# Patient Record
Sex: FEMALE | Race: AMERICAN INDIAN OR ALASKA NATIVE | NOT HISPANIC OR LATINO | URBAN - METROPOLITAN AREA
[De-identification: names, ages, dates, MRNs, and addresses within clinical notes are randomized per-mention and may not be internally consistent; named-entity substitution may affect disease eponyms.]

---

## 2017-06-22 ENCOUNTER — EMERGENCY (EMERGENCY)
Facility: HOSPITAL | Age: 62
LOS: 1 days | Discharge: HOME | End: 2017-06-22
Attending: EMERGENCY MEDICINE | Admitting: EMERGENCY MEDICINE
Payer: MEDICAID

## 2017-06-22 VITALS
SYSTOLIC BLOOD PRESSURE: 125 MMHG | OXYGEN SATURATION: 99 % | HEART RATE: 64 BPM | DIASTOLIC BLOOD PRESSURE: 80 MMHG | RESPIRATION RATE: 16 BRPM

## 2017-06-22 VITALS
OXYGEN SATURATION: 100 % | TEMPERATURE: 98 F | RESPIRATION RATE: 16 BRPM | HEART RATE: 65 BPM | DIASTOLIC BLOOD PRESSURE: 77 MMHG | SYSTOLIC BLOOD PRESSURE: 152 MMHG

## 2017-06-22 LAB
ALBUMIN SERPL ELPH-MCNC: 4.1 G/DL — SIGNIFICANT CHANGE UP (ref 3.3–5)
ALP SERPL-CCNC: 66 U/L — SIGNIFICANT CHANGE UP (ref 40–120)
ALT FLD-CCNC: 22 U/L — SIGNIFICANT CHANGE UP (ref 4–33)
AST SERPL-CCNC: 26 U/L — SIGNIFICANT CHANGE UP (ref 4–32)
BASE EXCESS BLDV CALC-SCNC: 1.6 MMOL/L — SIGNIFICANT CHANGE UP
BASOPHILS # BLD AUTO: 0.02 K/UL — SIGNIFICANT CHANGE UP (ref 0–0.2)
BASOPHILS NFR BLD AUTO: 0.4 % — SIGNIFICANT CHANGE UP (ref 0–2)
BILIRUB SERPL-MCNC: 0.4 MG/DL — SIGNIFICANT CHANGE UP (ref 0.2–1.2)
BLOOD GAS VENOUS - CREATININE: 0.47 MG/DL — LOW (ref 0.5–1.3)
BUN SERPL-MCNC: 10 MG/DL — SIGNIFICANT CHANGE UP (ref 7–23)
CALCIUM SERPL-MCNC: 9.5 MG/DL — SIGNIFICANT CHANGE UP (ref 8.4–10.5)
CHLORIDE BLDV-SCNC: 109 MMOL/L — HIGH (ref 96–108)
CHLORIDE SERPL-SCNC: 100 MMOL/L — SIGNIFICANT CHANGE UP (ref 98–107)
CK MB BLD-MCNC: 1.27 NG/ML — SIGNIFICANT CHANGE UP (ref 1–4.7)
CK MB BLD-MCNC: 1.78 NG/ML — SIGNIFICANT CHANGE UP (ref 1–4.7)
CK MB BLD-MCNC: SIGNIFICANT CHANGE UP (ref 0–2.5)
CK SERPL-CCNC: 100 U/L — SIGNIFICANT CHANGE UP (ref 25–170)
CK SERPL-CCNC: 91 U/L — SIGNIFICANT CHANGE UP (ref 25–170)
CO2 SERPL-SCNC: 23 MMOL/L — SIGNIFICANT CHANGE UP (ref 22–31)
CREAT SERPL-MCNC: 0.57 MG/DL — SIGNIFICANT CHANGE UP (ref 0.5–1.3)
EOSINOPHIL # BLD AUTO: 0.03 K/UL — SIGNIFICANT CHANGE UP (ref 0–0.5)
EOSINOPHIL NFR BLD AUTO: 0.5 % — SIGNIFICANT CHANGE UP (ref 0–6)
GAS PNL BLDV: 135 MMOL/L — LOW (ref 136–146)
GLUCOSE BLDV-MCNC: 113 — HIGH (ref 70–99)
GLUCOSE SERPL-MCNC: 108 MG/DL — HIGH (ref 70–99)
HCO3 BLDV-SCNC: 25 MMOL/L — SIGNIFICANT CHANGE UP (ref 20–27)
HCT VFR BLD CALC: 37 % — SIGNIFICANT CHANGE UP (ref 34.5–45)
HCT VFR BLDV CALC: 38.3 % — SIGNIFICANT CHANGE UP (ref 34.5–45)
HGB BLD-MCNC: 12.2 G/DL — SIGNIFICANT CHANGE UP (ref 11.5–15.5)
HGB BLDV-MCNC: 12.5 G/DL — SIGNIFICANT CHANGE UP (ref 11.5–15.5)
IMM GRANULOCYTES NFR BLD AUTO: 0.2 % — SIGNIFICANT CHANGE UP (ref 0–1.5)
LACTATE BLDV-MCNC: 1.5 MMOL/L — SIGNIFICANT CHANGE UP (ref 0.5–2)
LYMPHOCYTES # BLD AUTO: 2.1 K/UL — SIGNIFICANT CHANGE UP (ref 1–3.3)
LYMPHOCYTES # BLD AUTO: 38.2 % — SIGNIFICANT CHANGE UP (ref 13–44)
MCHC RBC-ENTMCNC: 28.9 PG — SIGNIFICANT CHANGE UP (ref 27–34)
MCHC RBC-ENTMCNC: 33 % — SIGNIFICANT CHANGE UP (ref 32–36)
MCV RBC AUTO: 87.7 FL — SIGNIFICANT CHANGE UP (ref 80–100)
MONOCYTES # BLD AUTO: 0.43 K/UL — SIGNIFICANT CHANGE UP (ref 0–0.9)
MONOCYTES NFR BLD AUTO: 7.8 % — SIGNIFICANT CHANGE UP (ref 2–14)
NEUTROPHILS # BLD AUTO: 2.91 K/UL — SIGNIFICANT CHANGE UP (ref 1.8–7.4)
NEUTROPHILS NFR BLD AUTO: 52.9 % — SIGNIFICANT CHANGE UP (ref 43–77)
PCO2 BLDV: 48 MMHG — SIGNIFICANT CHANGE UP (ref 41–51)
PH BLDV: 7.36 PH — SIGNIFICANT CHANGE UP (ref 7.32–7.43)
PLATELET # BLD AUTO: 341 K/UL — SIGNIFICANT CHANGE UP (ref 150–400)
PMV BLD: 10.2 FL — SIGNIFICANT CHANGE UP (ref 7–13)
PO2 BLDV: 45 MMHG — HIGH (ref 35–40)
POTASSIUM BLDV-SCNC: 3.7 MMOL/L — SIGNIFICANT CHANGE UP (ref 3.4–4.5)
POTASSIUM SERPL-MCNC: 4.2 MMOL/L — SIGNIFICANT CHANGE UP (ref 3.5–5.3)
POTASSIUM SERPL-SCNC: 4.2 MMOL/L — SIGNIFICANT CHANGE UP (ref 3.5–5.3)
PROT SERPL-MCNC: 8.1 G/DL — SIGNIFICANT CHANGE UP (ref 6–8.3)
RBC # BLD: 4.22 M/UL — SIGNIFICANT CHANGE UP (ref 3.8–5.2)
RBC # FLD: 12.9 % — SIGNIFICANT CHANGE UP (ref 10.3–14.5)
SAO2 % BLDV: 77.8 % — SIGNIFICANT CHANGE UP (ref 60–85)
SODIUM SERPL-SCNC: 138 MMOL/L — SIGNIFICANT CHANGE UP (ref 135–145)
TROPONIN T SERPL-MCNC: < 0.06 NG/ML — SIGNIFICANT CHANGE UP (ref 0–0.06)
TROPONIN T SERPL-MCNC: < 0.06 NG/ML — SIGNIFICANT CHANGE UP (ref 0–0.06)
TSH SERPL-MCNC: 0.18 UIU/ML — LOW (ref 0.27–4.2)
WBC # BLD: 5.5 K/UL — SIGNIFICANT CHANGE UP (ref 3.8–10.5)
WBC # FLD AUTO: 5.5 K/UL — SIGNIFICANT CHANGE UP (ref 3.8–10.5)

## 2017-06-22 PROCEDURE — 99284 EMERGENCY DEPT VISIT MOD MDM: CPT | Mod: 25

## 2017-06-22 PROCEDURE — 93010 ELECTROCARDIOGRAM REPORT: CPT | Mod: 59

## 2017-06-22 RX ORDER — MECLIZINE HCL 12.5 MG
1 TABLET ORAL
Qty: 15 | Refills: 0
Start: 2017-06-22 | End: 2017-06-27

## 2017-06-22 RX ORDER — MECLIZINE HCL 12.5 MG
50 TABLET ORAL ONCE
Qty: 0 | Refills: 0 | Status: COMPLETED | OUTPATIENT
Start: 2017-06-22 | End: 2017-06-22

## 2017-06-22 RX ADMIN — Medication 50 MILLIGRAM(S): at 10:20

## 2017-06-22 NOTE — ED PROVIDER NOTE - PROGRESS NOTE DETAILS
ED Attending Dr. Love: no longer with dizziness s/p ED treatment ED Attending Dr. Love: pt ambulating well in ED without difficulty, no ataxia ED Attending Dr. Love: pt stable, notes feeling well s/p treatment, will discharge with instructions to follow up with PMD and Rx meclizine

## 2017-06-22 NOTE — ED PROVIDER NOTE - OBJECTIVE STATEMENT
61 yo female with ?DM (diet-controlled,  in ED), in ED with few hours of "body spinning" sensation that made taking a walk this morning difficult.  Associated with 2 weeks of arm tingling bilaterally.  No CP/SOB, N/V/D or abdominal pain.

## 2017-06-22 NOTE — ED PROVIDER NOTE - CARE PLAN
Principal Discharge DX:	Benign paroxysmal positional vertigo, unspecified laterality  Instructions for follow-up, activity and diet:	1. TAKE ALL MEDICATIONS AS DIRECTED.    2. FOR PAIN OR FEVER YOU CAN TAKE IBUPROFEN (MOTRIN, ADVIL) OR ACETAMINOPHEN (TYLENOL) AS NEEDED, AS DIRECTED ON PACKAGING.  3. FOLLOW UP WITH YOUR PRIMARY DOCTOR WITHIN 5 DAYS AS DIRECTED.  4. IF YOU HAD LABS OR IMAGING DONE, YOU WERE GIVEN COPIES OF ALL LABS AND/OR IMAGING RESULTS FROM YOUR ER VISIT--PLEASE TAKE THEM WITH YOU TO YOUR FOLLOW UP APPOINTMENTS.  5. IF NEEDED, CALL PATIENT ACCESS SERVICES AT [phone] TO FIND A PRIMARY CARE PHYSICIAN.  OR CALL [phone] TO MAKE AN APPOINTMENT WITH THE CLINIC.  6. RETURN TO THE ER FOR ANY WORSENING SYMPTOMS OR CONCERNS.

## 2017-06-22 NOTE — ED ADULT TRIAGE NOTE - CHIEF COMPLAINT QUOTE
Patient brought in by EMS from home c/o dizziness and b/l arm "cramping." Symptoms resolved at present. Admits to decreased fluid intake. PMH- hypothyroid

## 2017-06-22 NOTE — ED SUB INTERN NOTE - MEDICAL DECISION MAKING DETAILS
63 y/o F w/ history of hypothyroidism and borderline DM presents with vertiginous dizziness since this morning. Clinical presentation and lack of focal findings on neuro exam make BPPV most likely, given history of DM and description of recent left arm paresthesias will obtain 2 sets of cardiac enzymes to r/o ischemia. Will start meclizine for dizziness symptoms and reassess.

## 2017-06-22 NOTE — ED PROVIDER NOTE - PLAN OF CARE
1. TAKE ALL MEDICATIONS AS DIRECTED.    2. FOR PAIN OR FEVER YOU CAN TAKE IBUPROFEN (MOTRIN, ADVIL) OR ACETAMINOPHEN (TYLENOL) AS NEEDED, AS DIRECTED ON PACKAGING.  3. FOLLOW UP WITH YOUR PRIMARY DOCTOR WITHIN 5 DAYS AS DIRECTED.  4. IF YOU HAD LABS OR IMAGING DONE, YOU WERE GIVEN COPIES OF ALL LABS AND/OR IMAGING RESULTS FROM YOUR ER VISIT--PLEASE TAKE THEM WITH YOU TO YOUR FOLLOW UP APPOINTMENTS.  5. IF NEEDED, CALL PATIENT ACCESS SERVICES AT [phone] TO FIND A PRIMARY CARE PHYSICIAN.  OR CALL [phone] TO MAKE AN APPOINTMENT WITH THE CLINIC.  6. RETURN TO THE ER FOR ANY WORSENING SYMPTOMS OR CONCERNS.

## 2017-06-22 NOTE — ED PROVIDER NOTE - MEDICAL DECISION MAKING DETAILS
body spinning sensation consistent with likely BPPV, low suspicion for central cause of symptoms; PLAN--meclizine, CE x 2 (low HEART score, low suspicion for ACS), re-eval

## 2017-06-22 NOTE — ED ADULT NURSE NOTE - OBJECTIVE STATEMENT
Pt received to Rm 1 AxOx4 with c/o dizziness w/o fainting and SOB started since this morning. Pt also stated that she has bilateral cramping in the arms with more tingling feeling to the left arm and left shoulder x 15 days. Pt denies of pain/chest pain/N/V/D. Pt was seen and evaluated by EDMD. Lab are drawn and sent. VS are as noted. FS was performed (117). Pt has 20G IV PIV placed in the right hand. Pt is able to ambulate with assistance. Pt's sister in-law is at bed side. Will continue to monitor Pt received to Rm 1 AxOx4 with c/o dizziness, SOB w/o LOC started since this morning. Pt also stated that she has bilateral cramping in the arms with more tingling feeling to the left arm and left shoulder x 15 days. Pt denies of pain/chest pain/N/V/D. Pt was seen and evaluated by EDMD. Lab are drawn and sent. VS are as noted. FS was performed (117). Pt has 20G IV PIV placed in the right hand. Pt is able to ambulate with assistance. Pt's sister in-law is at bed side. Will continue to monitor

## 2017-06-22 NOTE — ED SUB INTERN NOTE - OBJECTIVE STATEMENT FT
61 y/o [place] speaking female with history of hypothyroidism and borderline DM (diet controlled) presents with 3 hours of vertiginous dizziness. Pt states dizziness came on gradually this morning, described as room spinning and worse with movement. A/W generalized weakness and head "heaviness" but denies any headache. She also reports 2 weeks of intermittent bilateral arm cramping and left arm "tingling." She denies any CP/SOB/palpitations, syncope, fever/chills, melena or hematochezia, abd pain, n/v/d, hearing or vision changes, focal numbness or weakness, or recent URI. 63 y/o [place] speaking female (preferring family member to translate instead of  phone) with history of hypothyroidism and borderline DM (diet controlled) presents with 3 hours of vertiginous dizziness. Pt states dizziness came on gradually this morning, described as room spinning and worse with movement. A/W generalized weakness and head "heaviness" but denies any headache. She also reports 2 weeks of intermittent bilateral arm cramping and left arm "tingling." She denies any CP/SOB/palpitations, syncope, fever/chills, melena or hematochezia, abd pain, n/v/d, hearing or vision changes, focal numbness or weakness, or recent URI.

## 2020-02-07 ENCOUNTER — INPATIENT (INPATIENT)
Facility: HOSPITAL | Age: 65
LOS: 2 days | Discharge: HOME | End: 2020-02-10
Attending: INTERNAL MEDICINE | Admitting: INTERNAL MEDICINE
Payer: MEDICARE

## 2020-02-07 VITALS
TEMPERATURE: 98 F | OXYGEN SATURATION: 100 % | HEART RATE: 72 BPM | DIASTOLIC BLOOD PRESSURE: 94 MMHG | SYSTOLIC BLOOD PRESSURE: 163 MMHG | RESPIRATION RATE: 16 BRPM

## 2020-02-07 DIAGNOSIS — R07.9 CHEST PAIN, UNSPECIFIED: ICD-10-CM

## 2020-02-07 LAB
ALBUMIN SERPL ELPH-MCNC: 4.2 G/DL — SIGNIFICANT CHANGE UP (ref 3.3–5)
ALP SERPL-CCNC: 78 U/L — SIGNIFICANT CHANGE UP (ref 40–120)
ALT FLD-CCNC: 51 U/L — HIGH (ref 4–33)
ANION GAP SERPL CALC-SCNC: 11 MMO/L — SIGNIFICANT CHANGE UP (ref 7–14)
AST SERPL-CCNC: 45 U/L — HIGH (ref 4–32)
BILIRUB SERPL-MCNC: 0.4 MG/DL — SIGNIFICANT CHANGE UP (ref 0.2–1.2)
BUN SERPL-MCNC: 12 MG/DL — SIGNIFICANT CHANGE UP (ref 7–23)
CALCIUM SERPL-MCNC: 9.3 MG/DL — SIGNIFICANT CHANGE UP (ref 8.4–10.5)
CHLORIDE SERPL-SCNC: 102 MMOL/L — SIGNIFICANT CHANGE UP (ref 98–107)
CO2 SERPL-SCNC: 23 MMOL/L — SIGNIFICANT CHANGE UP (ref 22–31)
CREAT SERPL-MCNC: 0.6 MG/DL — SIGNIFICANT CHANGE UP (ref 0.5–1.3)
GLUCOSE SERPL-MCNC: 107 MG/DL — HIGH (ref 70–99)
HCT VFR BLD CALC: 41.2 % — SIGNIFICANT CHANGE UP (ref 34.5–45)
HGB BLD-MCNC: 13.3 G/DL — SIGNIFICANT CHANGE UP (ref 11.5–15.5)
MCHC RBC-ENTMCNC: 28.8 PG — SIGNIFICANT CHANGE UP (ref 27–34)
MCHC RBC-ENTMCNC: 32.3 % — SIGNIFICANT CHANGE UP (ref 32–36)
MCV RBC AUTO: 89.2 FL — SIGNIFICANT CHANGE UP (ref 80–100)
NRBC # FLD: 0 K/UL — SIGNIFICANT CHANGE UP (ref 0–0)
PLATELET # BLD AUTO: 355 K/UL — SIGNIFICANT CHANGE UP (ref 150–400)
PMV BLD: 9.9 FL — SIGNIFICANT CHANGE UP (ref 7–13)
POTASSIUM SERPL-MCNC: 3.9 MMOL/L — SIGNIFICANT CHANGE UP (ref 3.5–5.3)
POTASSIUM SERPL-SCNC: 3.9 MMOL/L — SIGNIFICANT CHANGE UP (ref 3.5–5.3)
PROT SERPL-MCNC: 8.8 G/DL — HIGH (ref 6–8.3)
RBC # BLD: 4.62 M/UL — SIGNIFICANT CHANGE UP (ref 3.8–5.2)
RBC # FLD: 13 % — SIGNIFICANT CHANGE UP (ref 10.3–14.5)
SODIUM SERPL-SCNC: 136 MMOL/L — SIGNIFICANT CHANGE UP (ref 135–145)
TROPONIN T, HIGH SENSITIVITY: 7 NG/L — SIGNIFICANT CHANGE UP (ref ?–14)
TROPONIN T, HIGH SENSITIVITY: 8 NG/L — SIGNIFICANT CHANGE UP (ref ?–14)
WBC # BLD: 6.7 K/UL — SIGNIFICANT CHANGE UP (ref 3.8–10.5)
WBC # FLD AUTO: 6.7 K/UL — SIGNIFICANT CHANGE UP (ref 3.8–10.5)

## 2020-02-07 PROCEDURE — 71046 X-RAY EXAM CHEST 2 VIEWS: CPT | Mod: 26

## 2020-02-07 PROCEDURE — 99223 1ST HOSP IP/OBS HIGH 75: CPT

## 2020-02-07 RX ORDER — PANTOPRAZOLE SODIUM 20 MG/1
40 TABLET, DELAYED RELEASE ORAL
Refills: 0 | Status: DISCONTINUED | OUTPATIENT
Start: 2020-02-08 | End: 2020-02-10

## 2020-02-07 RX ORDER — ASPIRIN/CALCIUM CARB/MAGNESIUM 324 MG
162 TABLET ORAL ONCE
Refills: 0 | Status: COMPLETED | OUTPATIENT
Start: 2020-02-07 | End: 2020-02-07

## 2020-02-07 RX ORDER — PANTOPRAZOLE SODIUM 20 MG/1
40 TABLET, DELAYED RELEASE ORAL
Refills: 0 | Status: COMPLETED | OUTPATIENT
Start: 2020-02-07 | End: 2020-02-07

## 2020-02-07 RX ADMIN — PANTOPRAZOLE SODIUM 40 MILLIGRAM(S): 20 TABLET, DELAYED RELEASE ORAL at 23:41

## 2020-02-07 RX ADMIN — Medication 162 MILLIGRAM(S): at 19:05

## 2020-02-07 NOTE — H&P ADULT - NSHPSOCIALHISTORY_GEN_ALL_CORE
SOCIAL HISTORY:    Marital Status:  (  )    (  ) Single        (  )         (  )   Occupation:   Lives with:         (  ) alone        (  ) children    (  ) spouse           (  ) parents            (  ) other    No history of smoking  No history of alcohol abuse  No history of illegal drug use SOCIAL HISTORY:    Marital Status:  ( x )    (  ) Single        (  )         (  )   Occupation:   Lives with:         (  ) alone        (  ) children    ( x ) spouse           (  ) parents            (  ) other    No history of smoking  No history of alcohol abuse  No history of illegal drug use    Independently ambulatory at baseline

## 2020-02-07 NOTE — H&P ADULT - NSICDXFAMILYHX_GEN_ALL_CORE_FT
FAMILY HISTORY:  No pertinent family history in first degree relatives FAMILY HISTORY:  Family history of coronary artery disease, ~ brother (?s/p stent)  Family history of hypertension, ~ father

## 2020-02-07 NOTE — ED PROVIDER NOTE - OBJECTIVE STATEMENT
Vitor FALL MD PGY2: 64 F PMH hypothyroid here for intermittent L sided CP since this am. Patient was at home and felt acute L sided chest pain and said " I'm going! Goodbye!" and proceeded to lean against a wall and family called ambulance. Assoc sxs include nausea and diaphoresis. No syncope. Here currently just feels a funny feeling in her chest. No prior MI. No prior PE. No fever, chills, cough.

## 2020-02-07 NOTE — H&P ADULT - NSHPLABSRESULTS_GEN_ALL_CORE
13.3   6.70  )-----------( 355      ( 07 Feb 2020 19:20 )             41.2     07 Feb 2020 19:20    136    |  102    |  12     ----------------------------<  107    3.9     |  23     |  0.60     Ca    9.3        07 Feb 2020 19:20    TPro  8.8    /  Alb  4.2    /  TBili  0.4    /  DBili  x      /  AST  45     /  ALT  51     /  AlkPhos  78     07 Feb 2020 19:20    LIVER FUNCTIONS - ( 07 Feb 2020 19:20 )  Alb: 4.2 g/dL / Pro: 8.8 g/dL / ALK PHOS: 78 u/L / ALT: 51 u/L / AST: 45 u/L / GGT: x             CAPILLARY BLOOD GLUCOSE    =========================================================================      =========================================================================    Vital Signs Last 24 Hrs  T(C): 36.8 (07 Feb 2020 17:03), Max: 36.8 (07 Feb 2020 17:03)  T(F): 98.2 (07 Feb 2020 17:03), Max: 98.2 (07 Feb 2020 17:03)  HR: 72 (07 Feb 2020 17:03) (72 - 72)  BP: 163/94 (07 Feb 2020 17:03) (163/94 - 163/94)  BP(mean): --  RR: 16 (07 Feb 2020 17:03) (16 - 16)  SpO2: 100% (07 Feb 2020 17:03) (100% - 100%) 13.3   6.70  )-----------( 355      ( 07 Feb 2020 19:20 )             41.2     07 Feb 2020 19:20    136    |  102    |  12     ----------------------------<  107    3.9     |  23     |  0.60     Ca    9.3        07 Feb 2020 19:20    TPro  8.8    /  Alb  4.2    /  TBili  0.4    /  DBili  x      /  AST  45     /  ALT  51     /  AlkPhos  78     07 Feb 2020 19:20    LIVER FUNCTIONS - ( 07 Feb 2020 19:20 )  Alb: 4.2 g/dL / Pro: 8.8 g/dL / ALK PHOS: 78 u/L / ALT: 51 u/L / AST: 45 u/L / GGT: x             CAPILLARY BLOOD GLUCOSE    =========================================================================  NSR at 65 bpm, QTc = 430    =========================================================================    Vital Signs Last 24 Hrs  T(C): 36.8 (07 Feb 2020 17:03), Max: 36.8 (07 Feb 2020 17:03)  T(F): 98.2 (07 Feb 2020 17:03), Max: 98.2 (07 Feb 2020 17:03)  HR: 72 (07 Feb 2020 17:03) (72 - 72)  BP: 163/94 (07 Feb 2020 17:03) (163/94 - 163/94)  BP(mean): --  RR: 16 (07 Feb 2020 17:03) (16 - 16)  SpO2: 100% (07 Feb 2020 17:03) (100% - 100%)

## 2020-02-07 NOTE — ED ADULT NURSE NOTE - NSIMPLEMENTINTERV_GEN_ALL_ED
Implemented All Universal Safety Interventions:  [place]inger to call system. Call bell, personal items and telephone within reach. Instruct patient to call for assistance. Room bathroom lighting operational. Non-slip footwear when patient is off stretcher. Physically safe environment: no spills, clutter or unnecessary equipment. Stretcher in lowest position, wheels locked, appropriate side rails in place.

## 2020-02-07 NOTE — ED PROVIDER NOTE - ATTENDING CONTRIBUTION TO CARE
Tawny Rivera M.D: 64F hx hypothyroid p/w intermittent substernal chest pressure radiating to neck/back with intermittent diaphoresis and dizzines. never felt like this before, thoguht she was going to die. no sob no n/v. nonsmoker, no fam hx heart disease. well appearing on exam with clear lungs and rrr heart. no le edema. rest of exam per resident. primary concern is acs given concerning associated symptoms. plan for labs including troponin, cxr, ekg already performed no ischemic changes , and admission for further risk stratification.

## 2020-02-07 NOTE — ED ADULT NURSE NOTE - OBJECTIVE STATEMENT
Receive pt. in ER room 28 alert and oriented x 4, presenting to the ER with complaints of chest pain. Pt. have a history of hypothyroidism and speaks [place]. Patient's  at bedside stated " we went shopping and my wife said she have chest pain in the middle of her chest". Placed on cardiac monitor labs sent, medicated as ordered. Will continue to monitor.

## 2020-02-07 NOTE — H&P ADULT - PROBLEM SELECTOR PLAN 1
- epigastric area, burning in nature; asymptomatic at the time of being seen  - troponin = 7  - ECG = NSR at 65 bpm, QTc = 430  - no history of HTN, cardiac disease, but FH w/ father w/ HTN and brother w/ CAD  - f/u lab-work, TTE  - trial of PPI for possible GERD  - continues on Telemetry monitoring

## 2020-02-07 NOTE — H&P ADULT - NSHPREVIEWOFSYSTEMS_GEN_ALL_CORE
REVIEW OF SYSTEMS:    CONSTITUTIONAL: No weakness, fevers or chills  EYES/ENT: No visual changes;  No dysphagia  NECK: No pain or stiffness,  RESPIRATORY: No cough, hemoptysis; No shortness of breath  CARDIOVASCULAR: Epigastric area burning pain.  Some dizziness and generalized weakness associated with the pain.  No shortness of breath or palpitations; No lower extremity edema  GASTROINTESTINAL: Epigastric pain - burning in nature. No nausea, vomiting, or hematemesis; No diarrhea or constipation. No melena or hematochezia.  GENITOURINARY: No dysuria, frequency or hematuria  MUSCULOSKELETAL: No joint pain, swelling, decreased ROM, erythema, warmth  NEUROLOGICAL: No numbness or weakness  PSYCHIATRY: No anxiety, or depression.  SKIN: No itching, burning, rashes, or lesions   All other review of systems is negative unless indicated above.

## 2020-02-07 NOTE — H&P ADULT - HISTORY OF PRESENT ILLNESS
64 year old female, with past history significant for Hypothyroidism, presented to the ED secondary to intermittent L-sided chest pain.  Seen and evaluated at bedside;    Vital signs upon ED presentation as follows: BP = 163/94, HR = 72, RR = 16, T = 36.8 C (98.2 F), O2 Sat = 100% on RA.  Diagnosed with Chest Pain.  Prescribed Aspirin 162 mg in the ED. 64 year old female, with past history significant for Hypothyroidism, presented to the ED secondary to intermittent L-sided chest pain.  Seen and evaluated at bedside with  present; NAD.  Patient indicates  to provide history.  Per reports, ini the AM, patient reported pain of     Vital signs upon ED presentation as follows: BP = 163/94, HR = 72, RR = 16, T = 36.8 C (98.2 F), O2 Sat = 100% on RA.  Diagnosed with Chest Pain.  Prescribed Aspirin 162 mg in the ED. 64 year old female, with past history significant for Hypothyroidism, presented to the ED secondary to intermittent L-sided chest pain.  Seen and evaluated at bedside with  present; NAD.  Patient indicates  to provide history.  Per reports, ini the AM, patient reported burning pain in the epigastric area, which subsided after some time.  Later in the day, patient went shopping and again complained of pain in the epigastrium, but also had dizziness and tongue dryness.  Patient visited her PMD who advised coming to the ED for evaluation.  No reports of shortness of breath, palpitations, nausea, vomiting.  No prior similar episodes.  Patient notedly, is able to walk long distances - up to 6 miles, per .    Patient reports burning epigastric pain after eating pizza.  Tends to recline in bed very soon after eating dinner.    Vital signs upon ED presentation as follows: BP = 163/94, HR = 72, RR = 16, T = 36.8 C (98.2 F), O2 Sat = 100% on RA.  Diagnosed with Chest Pain.  Prescribed Aspirin 162 mg in the ED.

## 2020-02-07 NOTE — H&P ADULT - PROBLEM SELECTOR PLAN 2
- present for the past ~ 2 days  - possibly due to lifting heavy items as patient in the process of packing for traveling, but concerning also since patient also with chest area discomfort  - Tylenol PRN  - eval for improvement - BP = 163/94 on admission; SBP max = 169, DBP max = 94  - asymptomatic  - no history of same, but father w/ HTN and brother w/ CAD  - may have undiagnosed dz  - follow BP and initiate therapy if needed  - f/u TTE

## 2020-02-07 NOTE — H&P ADULT - ASSESSMENT
64 year old female, with past history significant for Hypothyroidism, presented to the ED secondary to intermittent L-sided chest pain.  Vital signs upon ED presentation as follows: BP = 163/94, HR = 72, RR = 16, T = 36.8 C (98.2 F), O2 Sat = 100% on RA.  Diagnosed with Chest Pain.  Admitted for further management of:

## 2020-02-07 NOTE — H&P ADULT - PROBLEM SELECTOR PLAN 5
1.  PCP Name                           =   2.  PCP Contacted at Admission =  [  ] Y       [  ] N          [  ] N/A  3.  PCP Contacted at Discharge  =  [  ] Y       [  ] N          [  ] N/A  4.  Post-Discharge Appointment Date and Location =   5.  Summary of Handoff Given to PCP                    = - on synthroid 50 mcg PTA; continuing

## 2020-02-07 NOTE — ED ADULT TRIAGE NOTE - CHIEF COMPLAINT QUOTE
Pt brought in form PCP by EMS initially for c/o of chest pain. As per EMS when pt was transferred into ambulance at approximately 1620 pt became aphasiac. Pt able to speak in triage. MD Sutton called for stroke eval. NO code stroke called. Pt c/o midsternal chest pain.

## 2020-02-07 NOTE — H&P ADULT - PROBLEM SELECTOR PLAN 3
- protein = 8.8; albumin = 4.2  - also has mildly elevated transaminases  - patient is vegetarian  - unclear etiology at present  - f/u AM lab-work  - further w/u as outpatient - present for the past ~ 2 days  - possibly due to lifting heavy items as patient in the process of packing for traveling, but concerning also since patient also with chest area discomfort  - Tylenol PRN  - eval for improvement

## 2020-02-07 NOTE — ED PROVIDER NOTE - PHYSICAL EXAMINATION
Vitor FALL MD PGY2:   PHYSICAL EXAM:    GENERAL: NAD, well-developed  HEENT:  Atraumatic, Normocephalic  CHEST/LUNG: Chest rise equal bilaterally  HEART: Regular rate and rhythm  ABDOMEN: Soft, Nontender, Nondistended  EXTREMITIES:  2+ Peripheral Pulses.  PSYCH: A&Ox3  SKIN: No obvious rashes or lesions

## 2020-02-07 NOTE — H&P ADULT - PROBLEM SELECTOR PLAN 4
- on synthroid 50 mcg PTA; continuing - protein = 8.8; albumin = 4.2  - also has mildly elevated transaminases  - patient is vegetarian  - unclear etiology at present  - f/u AM lab-work  - further w/u as outpatient

## 2020-02-07 NOTE — ED PROVIDER NOTE - CLINICAL SUMMARY MEDICAL DECISION MAKING FREE TEXT BOX
Vitor FALL MD PGY2: Patient here of  descent 65 y/o with severe CP at onset. EKG nl. WIll obtain trop and admit for further cardiac workup.

## 2020-02-07 NOTE — H&P ADULT - NSHPPHYSICALEXAM_GEN_ALL_CORE
PHYSICAL EXAMINATION:    APPEARANCE: Well appearing female, lying in stretcher in NAD. 	  HEENT: Mildly dry oral mucosa.  PERRL, EOMI	  LYMPHATIC: No lymphadenopathy  CARDIOVASCULAR: No reproducible chest tenderness.  (+) S1 S2, No JVD, No murmurs, No edema  RESPIRATORY: Lungs clear to auscultation	  PSYCHIATRIC: A&O x 3, Mood & affect appropriate to situation  GASTROINTESTINAL:  No reproducible epigastric area tenderness.  Soft, Non-tender, + BS	  SKIN: No rashes, No ecchymoses, No cyanosis	  NEUROLOGICAL: Non-focal, A&Ox3, nonfocal, NOBLES x 4  EXTREMITIES: Some tenderness to moderate palpation over B/L posterior legs.  Normal range of motion, No clubbing, cyanosis or edema  VASCULAR: Peripheral pulses palpable 2+ bilaterally

## 2020-02-08 DIAGNOSIS — N39.0 URINARY TRACT INFECTION, SITE NOT SPECIFIED: ICD-10-CM

## 2020-02-08 DIAGNOSIS — R07.9 CHEST PAIN, UNSPECIFIED: ICD-10-CM

## 2020-02-08 DIAGNOSIS — E88.09 OTHER DISORDERS OF PLASMA-PROTEIN METABOLISM, NOT ELSEWHERE CLASSIFIED: ICD-10-CM

## 2020-02-08 DIAGNOSIS — R03.0 ELEVATED BLOOD-PRESSURE READING, WITHOUT DIAGNOSIS OF HYPERTENSION: ICD-10-CM

## 2020-02-08 DIAGNOSIS — Z02.9 ENCOUNTER FOR ADMINISTRATIVE EXAMINATIONS, UNSPECIFIED: ICD-10-CM

## 2020-02-08 DIAGNOSIS — M25.511 PAIN IN RIGHT SHOULDER: ICD-10-CM

## 2020-02-08 DIAGNOSIS — E03.9 HYPOTHYROIDISM, UNSPECIFIED: ICD-10-CM

## 2020-02-08 LAB
24R-OH-CALCIDIOL SERPL-MCNC: 15.9 NG/ML — LOW (ref 30–80)
ALBUMIN SERPL ELPH-MCNC: 3.9 G/DL — SIGNIFICANT CHANGE UP (ref 3.3–5)
ALP SERPL-CCNC: 74 U/L — SIGNIFICANT CHANGE UP (ref 40–120)
ALT FLD-CCNC: 49 U/L — HIGH (ref 4–33)
ANION GAP SERPL CALC-SCNC: 11 MMO/L — SIGNIFICANT CHANGE UP (ref 7–14)
APPEARANCE UR: CLEAR — SIGNIFICANT CHANGE UP
APTT BLD: 29.8 SEC — SIGNIFICANT CHANGE UP (ref 27.5–36.3)
AST SERPL-CCNC: 42 U/L — HIGH (ref 4–32)
BACTERIA # UR AUTO: SIGNIFICANT CHANGE UP
BILIRUB SERPL-MCNC: 0.5 MG/DL — SIGNIFICANT CHANGE UP (ref 0.2–1.2)
BILIRUB UR-MCNC: NEGATIVE — SIGNIFICANT CHANGE UP
BLOOD UR QL VISUAL: NEGATIVE — SIGNIFICANT CHANGE UP
BUN SERPL-MCNC: 11 MG/DL — SIGNIFICANT CHANGE UP (ref 7–23)
CALCIUM SERPL-MCNC: 8.9 MG/DL — SIGNIFICANT CHANGE UP (ref 8.4–10.5)
CHLORIDE SERPL-SCNC: 104 MMOL/L — SIGNIFICANT CHANGE UP (ref 98–107)
CHOLEST SERPL-MCNC: 191 MG/DL — SIGNIFICANT CHANGE UP (ref 120–199)
CO2 SERPL-SCNC: 21 MMOL/L — LOW (ref 22–31)
COLOR SPEC: COLORLESS — SIGNIFICANT CHANGE UP
CREAT SERPL-MCNC: 0.56 MG/DL — SIGNIFICANT CHANGE UP (ref 0.5–1.3)
D DIMER BLD IA.RAPID-MCNC: 191 NG/ML — SIGNIFICANT CHANGE UP
GLUCOSE SERPL-MCNC: 101 MG/DL — HIGH (ref 70–99)
GLUCOSE UR-MCNC: NEGATIVE — SIGNIFICANT CHANGE UP
HBA1C BLD-MCNC: 6.6 % — HIGH (ref 4–5.6)
HDLC SERPL-MCNC: 34 MG/DL — LOW (ref 45–65)
HYALINE CASTS # UR AUTO: NEGATIVE — SIGNIFICANT CHANGE UP
INR BLD: 0.97 — SIGNIFICANT CHANGE UP (ref 0.88–1.17)
KETONES UR-MCNC: NEGATIVE — SIGNIFICANT CHANGE UP
LEUKOCYTE ESTERASE UR-ACNC: SIGNIFICANT CHANGE UP
LIPID PNL WITH DIRECT LDL SERPL: 150 MG/DL — SIGNIFICANT CHANGE UP
MAGNESIUM SERPL-MCNC: 2.3 MG/DL — SIGNIFICANT CHANGE UP (ref 1.6–2.6)
NITRITE UR-MCNC: NEGATIVE — SIGNIFICANT CHANGE UP
PH UR: 6.5 — SIGNIFICANT CHANGE UP (ref 5–8)
PHOSPHATE SERPL-MCNC: 2.9 MG/DL — SIGNIFICANT CHANGE UP (ref 2.5–4.5)
POTASSIUM SERPL-MCNC: 3.8 MMOL/L — SIGNIFICANT CHANGE UP (ref 3.5–5.3)
POTASSIUM SERPL-SCNC: 3.8 MMOL/L — SIGNIFICANT CHANGE UP (ref 3.5–5.3)
PROT SERPL-MCNC: 8.1 G/DL — SIGNIFICANT CHANGE UP (ref 6–8.3)
PROT UR-MCNC: NEGATIVE — SIGNIFICANT CHANGE UP
PROTHROM AB SERPL-ACNC: 11 SEC — SIGNIFICANT CHANGE UP (ref 9.8–13.1)
RBC CASTS # UR COMP ASSIST: SIGNIFICANT CHANGE UP (ref 0–?)
SODIUM SERPL-SCNC: 136 MMOL/L — SIGNIFICANT CHANGE UP (ref 135–145)
SP GR SPEC: 1 — SIGNIFICANT CHANGE UP (ref 1–1.04)
SQUAMOUS # UR AUTO: SIGNIFICANT CHANGE UP
T4 FREE SERPL-MCNC: 1.26 NG/DL — SIGNIFICANT CHANGE UP (ref 0.9–1.8)
TRIGL SERPL-MCNC: 116 MG/DL — SIGNIFICANT CHANGE UP (ref 10–149)
TROPONIN T, HIGH SENSITIVITY: 9 NG/L — SIGNIFICANT CHANGE UP (ref ?–14)
TROPONIN T, HIGH SENSITIVITY: < 6 NG/L — SIGNIFICANT CHANGE UP (ref ?–14)
TSH SERPL-MCNC: 4.55 UIU/ML — HIGH (ref 0.27–4.2)
UROBILINOGEN FLD QL: NORMAL — SIGNIFICANT CHANGE UP
WBC UR QL: HIGH (ref 0–?)

## 2020-02-08 RX ORDER — ASPIRIN/CALCIUM CARB/MAGNESIUM 324 MG
81 TABLET ORAL DAILY
Refills: 0 | Status: DISCONTINUED | OUTPATIENT
Start: 2020-02-08 | End: 2020-02-10

## 2020-02-08 RX ORDER — CEFTRIAXONE 500 MG/1
1 INJECTION, POWDER, FOR SOLUTION INTRAMUSCULAR; INTRAVENOUS ONCE
Refills: 0 | Status: DISCONTINUED | OUTPATIENT
Start: 2020-02-08 | End: 2020-02-08

## 2020-02-08 RX ORDER — LEVOTHYROXINE SODIUM 125 MCG
50 TABLET ORAL DAILY
Refills: 0 | Status: DISCONTINUED | OUTPATIENT
Start: 2020-02-08 | End: 2020-02-10

## 2020-02-08 RX ORDER — CEFTRIAXONE 500 MG/1
INJECTION, POWDER, FOR SOLUTION INTRAMUSCULAR; INTRAVENOUS
Refills: 0 | Status: DISCONTINUED | OUTPATIENT
Start: 2020-02-08 | End: 2020-02-08

## 2020-02-08 RX ORDER — LEVOTHYROXINE SODIUM 125 MCG
1 TABLET ORAL
Qty: 0 | Refills: 0 | DISCHARGE

## 2020-02-08 RX ORDER — ACETAMINOPHEN 500 MG
650 TABLET ORAL EVERY 6 HOURS
Refills: 0 | Status: DISCONTINUED | OUTPATIENT
Start: 2020-02-08 | End: 2020-02-10

## 2020-02-08 RX ORDER — CEFTRIAXONE 500 MG/1
1000 INJECTION, POWDER, FOR SOLUTION INTRAMUSCULAR; INTRAVENOUS EVERY 24 HOURS
Refills: 0 | Status: COMPLETED | OUTPATIENT
Start: 2020-02-08 | End: 2020-02-10

## 2020-02-08 RX ADMIN — Medication 50 MICROGRAM(S): at 06:14

## 2020-02-08 RX ADMIN — Medication 650 MILLIGRAM(S): at 15:27

## 2020-02-08 RX ADMIN — PANTOPRAZOLE SODIUM 40 MILLIGRAM(S): 20 TABLET, DELAYED RELEASE ORAL at 06:13

## 2020-02-08 RX ADMIN — CEFTRIAXONE 100 MILLIGRAM(S): 500 INJECTION, POWDER, FOR SOLUTION INTRAMUSCULAR; INTRAVENOUS at 15:40

## 2020-02-08 RX ADMIN — Medication 650 MILLIGRAM(S): at 14:27

## 2020-02-08 RX ADMIN — Medication 81 MILLIGRAM(S): at 12:28

## 2020-02-08 NOTE — DISCHARGE NOTE PROVIDER - CARE PROVIDER_API CALL
Della Long; MBBS)  Family Medicine  [address] [place] [address]  Phone: [phone]  Fax: [phone]  Follow Up Time: 2 weeks    Dr. Dominic  Your cardiologist  Phone: (   )    -  Fax: (   )    -  Follow Up Time: 2 weeks    Beto Hancock (DO)  Internal Medicine  [address] [place] [address]  Phone: [phone]  Fax: [phone]  Follow Up Time: 1 month

## 2020-02-08 NOTE — PROGRESS NOTE ADULT - SUBJECTIVE AND OBJECTIVE BOX
CHRISTINA [id number]  64yFemale  T(C): 36.4 (02-08-20 @ 13:44), Max: 36.7 (02-07-20 @ 22:50)  HR: 83 (02-08-20 @ 13:44) (60 - 83)  BP: 134/84 (02-08-20 @ 13:44) (124/79 - 168/85)  RR: 17 (02-08-20 @ 13:44) (17 - 20)  SpO2: 99% (02-08-20 @ 13:44) (97% - 99%)  Wt(kg): --  normal cephalic atraumatic

## 2020-02-08 NOTE — DISCHARGE NOTE PROVIDER - NSDCMRMEDTOKEN_GEN_ALL_CORE_FT
Aleve:   Synthroid 50 mcg (0.05 mg) oral tablet: 1 tab(s) orally once a day aspirin 81 mg oral delayed release tablet: 1 tab(s) orally once a day  pantoprazole 40 mg oral delayed release tablet: 1 tab(s) orally once a day (before a meal)  Synthroid 50 mcg (0.05 mg) oral tablet: 1 tab(s) orally once a day

## 2020-02-08 NOTE — CONSULT NOTE ADULT - ASSESSMENT
64 year old female with history of Hypothyroidism admitted with intermittent L-sided chest pain.    1. Chest Pain  -multiple episodes  -asa  -check echo   -nuclear stress testing   -ppi  -trop negative    2. Hypertension  -bp normal off meds  -cont to monitor     3. Acute pain of both shoulders  -med eval     4. Hyperproteinemia.   -unclear etiology at present  -f/u AM lab-work  -renal eval     5. Hypothyroidism  -cont synthroid   6. Elev LFTS  GI eval     dvt ppx
64y Female with no significant PMHx presents with CP. Nephrology consulted for hyperproteinemia.    1) Hyperproteinemia: likely due to diet given normal albumin but will check paraproteinemia work up r/o underlying bone marrow disorder.    2) HTN: BP controlled off of medications.     3) Acute cystitis without hematuria: Patient symptomatic (urinary frequency. Start empiric CTX X 3 days after urine culture obtained.    4) CP: As per cardiology.

## 2020-02-08 NOTE — CHART NOTE - NSCHARTNOTEFT_GEN_A_CORE
Alerted by RN, Pt c/o CP. Pt seen and examined, c/o midsternal chest pain, radiating to b/l shoulders. Pt states this is the same pain that brought her into the hospital which she has been experiencing for the last 2-3 days. Pt admits to LE edema which she states is chronic, worse when traveling, reports she traveled to [place] the first week of January this year. Pt's family member reports she does do a lot of lifting, transporting laundry/groceries to her apartment from lower level of building. Pt denies fever/chills/cough, palpitations, SOB, abd pain, n/v/d, SOB, pleuritic chest pain.     VS: T(F): 97.6, Max: 98.2 HR: 83 (60 - 83) BP: 134/84 (124/79 - 168/85) RR: 17 (16 - 20) Alerted by RN, Pt c/o CP. Pt seen and examined, c/o midsternal chest pain, radiating to b/l shoulders. Pt states this is the same pain that brought her into the hospital which she has been experiencing for the last 2-3 days. Pt admits to LE edema which she states is chronic, worse when traveling, reports she traveled to [place] the first week of January this year. Pt's family member reports she does do a lot of lifting, transporting laundry/groceries to her apartment from lower level of building. Pt denies fever/chills/cough, palpitations, SOB, abd pain, n/v/d, SOB, pleuritic chest pain. Upon physical exam, Pt reports chest pain has resolved, but has been intermittent this morning, lasting 5-10 min at a time. Denies palliating/provoking factors.     VS: T(F): 97.6, Max: 98.2 HR: 83 (60 - 83) BP: 134/84 (124/79 - 168/85) RR: 17   A+O x 3, lying flat in bed, NAD  RRR +S1S2, no m/g/r, mild midsternal reproducible chest pain  Abd: Soft, NT/ND  LE: Mild edema b/l, L>R.                         13.3   6.70  )-----------( 355      ( 07 Feb 2020 19:20 )             41.2   02-08    136  |  104  |  11  ----------------------------<  101<H>  3.8   |  21<L>  |  0.56    Ca    8.9      08 Feb 2020 06:30  Phos  2.9     02-08  Mg     2.3     02-08    TPro  8.1  /  Alb  3.9  /  TBili  0.5  /  DBili  x   /  AST  42<H>  /  ALT  49<H>  /  AlkPhos  74  02-08    EKG NSR @ 72bpm. No ischemic changes, unchanged from admission.   Tele: NSR HR 60s, transient bursts of STach up to 140s    65yo F with PMHx of hypothyroidism p/w midsternal to left sided chest pain, on admission, Pt noted with elevated BP and HgbA1C 6.6%, now with recurrent chest pain and noted with LE edema L>R with recent travel.   1) Chest pain:  EKG unchanged, no significant ST changes noted. Discussed with Dr. Mittal, recommend to check repeat hsTrop and DDimer, c/w telemetry. If both are negative, proceed with echocardiogram and stress as planned for tomorrow. Pain control as needed.   2) LE edema, L>R: Discussed with Dr. Mittal, recommend LE doppler, ordered. Will f/u DDimer. Will continue to monitor closely.

## 2020-02-08 NOTE — PROGRESS NOTE ADULT - PROBLEM SELECTOR PLAN 4
alb nml  seen by renal   - unclear etiology at present  - f/u AM lab-work  - further w/u as outpatient

## 2020-02-08 NOTE — PROVIDER CONTACT NOTE (OTHER) - ACTION/TREATMENT ORDERED:
Provider will look into it and provide order clarification
OK to give PRN tylenol. Will continue to monitor
Will continue to monitor.

## 2020-02-08 NOTE — PROGRESS NOTE ADULT - SUBJECTIVE AND OBJECTIVE BOX
Patient is a 64y old  Female who presents with a chief complaint of Chest pain (2020 17:16)    pt. seen and examined, requested by dr. crane   denies any CP now     INTERVAL HPI/OVERNIGHT EVENTS:  T(C): 36.4 (20 @ 13:44), Max: 36.7 (20 @ 22:50)  HR: 83 (20 @ 13:44) (60 - 83)  BP: 134/84 (20 @ 13:44) (124/79 - 168/85)  RR: 17 (20 @ 13:44) (17 - 20)  SpO2: 99% (20 @ 13:44) (97% - 99%)  Wt(kg): --  I&O's Summary      PAST MEDICAL & SURGICAL HISTORY:  Hypothyroidism, unspecified type  No significant past surgical history      SOCIAL HISTORY  Alcohol:  Tobacco:  Illicit substance use:    FAMILY HISTORY:    REVIEW OF SYSTEMS:  CONSTITUTIONAL: No fever, weight loss, or fatigue  EYES: No eye pain, visual disturbances, or discharge  ENMT:  No difficulty hearing, tinnitus, vertigo; No sinus or throat pain  NECK: No pain or stiffness  RESPIRATORY: No cough, wheezing, chills or hemoptysis; No shortness of breath  CARDIOVASCULAR: No chest pain, palpitations, dizziness, or leg swelling  GASTROINTESTINAL: No abdominal or epigastric pain. No nausea, vomiting, or hematemesis; No diarrhea or constipation. No melena or hematochezia.  GENITOURINARY: No dysuria, frequency, hematuria, or incontinence  NEUROLOGICAL: No headaches, memory loss, loss of strength, numbness, or tremors  SKIN: No itching, burning, rashes, or lesions   LYMPH NODES: No enlarged glands  ENDOCRINE: No heat or cold intolerance; No hair loss  MUSCULOSKELETAL: No joint pain or swelling; No muscle, back, or extremity pain  PSYCHIATRIC: No depression, anxiety, mood swings, or difficulty sleeping  HEME/LYMPH: No easy bruising, or bleeding gums  ALLERY AND IMMUNOLOGIC: No hives or eczema    RADIOLOGY & ADDITIONAL TESTS:    Imaging Personally Reviewed:  [ ] YES  [ ] NO    Consultant(s) Notes Reviewed:  [ ] YES  [ ] NO    PHYSICAL EXAM:  GENERAL: NAD, well-groomed, well-developed  HEAD:  Atraumatic, Normocephalic  EYES: EOMI, PERRLA, conjunctiva and sclera clear  ENMT: No tonsillar erythema, exudates, or enlargement; Moist mucous membranes, Good dentition, No lesions  NECK: Supple, No JVD, Normal thyroid  NERVOUS SYSTEM:  Alert & Oriented X3, Good concentration; Motor Strength 5/5 B/L upper and lower extremities; DTRs 2+ intact and symmetric  CHEST/LUNG: Clear to percussion bilaterally; No rales, rhonchi, wheezing, or rubs  HEART: Regular rate and rhythm; No murmurs, rubs, or gallops  ABDOMEN: Soft, Nontender, Nondistended; Bowel sounds present  EXTREMITIES:  2+ Peripheral Pulses, No clubbing, cyanosis, or edema  LYMPH: No lymphadenopathy noted  SKIN: No rashes or lesions    LABS:                        13.3   6.70  )-----------( 355      ( 2020 19:20 )             41.2     02-08    136  |  104  |  11  ----------------------------<  101<H>  3.8   |  21<L>  |  0.56    Ca    8.9      2020 06:30  Phos  2.9     02-08  Mg     2.3     02-08    TPro  8.1  /  Alb  3.9  /  TBili  0.5  /  DBili  x   /  AST  42<H>  /  ALT  49<H>  /  AlkPhos  74  02-08    PT/INR - ( 2020 06:30 )   PT: 11.0 SEC;   INR: 0.97          PTT - ( 2020 06:30 )  PTT:29.8 SEC  Urinalysis Basic - ( 2020 07:00 )    Color: COLORLESS / Appearance: CLEAR / S.005 / pH: 6.5  Gluc: NEGATIVE / Ketone: NEGATIVE  / Bili: NEGATIVE / Urobili: NORMAL   Blood: NEGATIVE / Protein: NEGATIVE / Nitrite: NEGATIVE   Leuk Esterase: LARGE / RBC: 0-2 / WBC 26-50   Sq Epi: OCC / Non Sq Epi: x / Bacteria: FEW      CAPILLARY BLOOD GLUCOSE            Urinalysis Basic - ( 2020 07:00 )    Color: COLORLESS / Appearance: CLEAR / S.005 / pH: 6.5  Gluc: NEGATIVE / Ketone: NEGATIVE  / Bili: NEGATIVE / Urobili: NORMAL   Blood: NEGATIVE / Protein: NEGATIVE / Nitrite: NEGATIVE   Leuk Esterase: LARGE / RBC: 0-2 / WBC 26-50   Sq Epi: OCC / Non Sq Epi: x / Bacteria: FEW        MEDICATIONS  (STANDING):  aspirin enteric coated 81 milliGRAM(s) Oral daily  cefTRIAXone   IVPB 1000 milliGRAM(s) IV Intermittent every 24 hours  levothyroxine 50 MICROGram(s) Oral daily  pantoprazole    Tablet 40 milliGRAM(s) Oral before breakfast    MEDICATIONS  (PRN):  acetaminophen   Tablet .. 650 milliGRAM(s) Oral every 6 hours PRN Mild Pain (1 - 3)      Care Discussed with Consultants/Other Providers [ ] YES  [ ] NO

## 2020-02-08 NOTE — CONSULT NOTE ADULT - ATTENDING COMMENTS
[facility] NEPHROLOGY  Jose Nolan M.D.  Beto Hancock D.O.  Sydnie Martinez M.D.  Tami Kearns, MSN, ANP-C    Telephone: [phone]  Facsimile: [phone]    [address] [place] [address]

## 2020-02-08 NOTE — DISCHARGE NOTE PROVIDER - HOSPITAL COURSE
64 year old female, with past history significant for Hypothyroidism, presented to the ED secondary to intermittent L-sided chest pain.          Hospital Course:     Chest pain: epigastric area, burning in nature; asymptomatic at the time of being seen    - troponin = 7, ECG = NSR at 65 bpm, QTc = 430    - no history of HTN, cardiac disease, but FH w/ father w/ HTN and brother w/ CAD    - trial of PPI for possible GERD    - continues on Telemetry monitoring.         Elevated blood pressure reading    - no history of same, but father w/ HTN and brother w/ CAD    - may have undiagnosed dz    - follow BP and initiate therapy if needed    - f/u TTE.        Acute pain of both shoulders. Plan: - present for the past ~ 2 days    - possibly due to lifting heavy items as patient in the process of packing for traveling, but concerning also since patient also with chest area discomfort    - Tylenol PRN        Hyperproteinemia. Plan: - protein = 8.8; albumin = 4.2    - also has mildly elevated transaminases    - patient is vegetarian, unclear etiology at present        Hypothyroidism: on synthroid 50 mcg        **INCOMPLETE 64 year old female, with past history significant for Hypothyroidism, presented to the ED secondary to intermittent L-sided chest pain.  troponin noted to be 7, EKG NSR @ 65 bpm with .  Telemetry monitoring revealed no arrhythmias. Stress test performed  revealing normal myocardial perfusion scan, with no evidence of infarction or inducible ischemia.  Chest x-ray with clear lungs.  Echocardiogram performed with EF of 72%.  UA positive and suggestive of You were started on antibiotics for a urinary infection. To help prevent future infections maintain healthy hygiene and avoid taking long baths. Wipe from front to back and empty your bladder frequently by keeping yourself properly hydrated. Monitor for signs/symptoms of infection, such as, fever/chills, burning/pain with urination, urinary frequency/hesitancy, cloudy urine, or blood in urine and follow-up with your primary care provider as outpatient for further care., patient with dysuria and treated with 3 day course of ceftriaxone IV, urine culture was sent however contaminated.  Ultrasound of bilateral lower extremities performed and negative for DVT.  patient evaluated by GI service who recommend conservative management with PPI daily, hepatology serologies negative.  patient stable for discharge discussed with Dr. Mittal.

## 2020-02-08 NOTE — PROVIDER CONTACT NOTE (OTHER) - ASSESSMENT
Pt states she hasn't had a bowel movement since Thursday 2/6. Bowel sound hypoactive in all 4 quadrants. No pain or tenderness upon palpation. Abdomen non-distended. Lisandra LACKEY

## 2020-02-08 NOTE — CONSULT NOTE ADULT - SUBJECTIVE AND OBJECTIVE BOX
CARDIOLOGY INITIAL PROGRESS NOTE - DR. SUNSHINE    HPI:  PAtient is a 64 year old female with history of Hypothyroidism admitted with intermittent L-sided chest pain.    Had multiple episodes  Patient denies any active chest pain, dyspnea, palpitations, cough, syncope, edema, exertional symptoms, nausea, abdominal pain, fever, chills,  or rash.  Denies any history of CAD, MI, valve disease, cardiomyopathy, or congenital heart disease.    + dizziness        PAST MEDICAL & SURGICAL HISTORY:  Hypothyroidism, unspecified type  No significant past surgical history        PREVIOUS DIAGNOSTIC TESTING:    [ ] Echocardiogram:  [ ]  Catheterization:  [ ] Stress Test:  	    MEDICATIONS:    Home Medications:  Aleve:  (08 Feb 2020 00:30)  Synthroid 50 mcg (0.05 mg) oral tablet: 1 tab(s) orally once a day (08 Feb 2020 00:30)      MEDICATIONS  (STANDING):  levothyroxine 50 MICROGram(s) Oral daily  pantoprazole    Tablet 40 milliGRAM(s) Oral before breakfast      FAMILY HISTORY:  Family history of coronary artery disease: ~ brother (?s/p stent)  Family history of hypertension: ~ father      SOCIAL HISTORY:    x[ ] Non-smoker  [ ] Smoker  [ ] Alcohol    Allergies    No Known Allergies    Intolerances    	    REVIEW OF SYSTEMS:  CONSTITUTIONAL: No fever, weight loss, or fatigue  EYES: No eye pain, visual disturbances, or discharge  ENMT:  No difficulty hearing, tinnitus, vertigo; No sinus or throat pain  NECK: No pain or stiffness  RESPIRATORY: No cough, wheezing, chills or hemoptysis; No Shortness of Breath  CARDIOVASCULAR: as HPI  GASTROINTESTINAL: No abdominal or epigastric pain. No nausea, vomiting, or hematemesis; No diarrhea or constipation. No melena or hematochezia.  GENITOURINARY: No dysuria, frequency, hematuria, or incontinence  NEUROLOGICAL: No headaches, memory loss, loss of strength, numbness, or tremors  SKIN: No itching, burning, rashes, or lesions   	  [ ] All others negative	  [ ] Unable to obtain    PHYSICAL EXAM:    T(C): 36.3 (02-08-20 @ 06:11), Max: 36.8 (02-07-20 @ 17:03)  HR: 60 (02-08-20 @ 06:11) (60 - 78)  BP: 124/79 (02-08-20 @ 06:11) (124/79 - 168/85)  RR: 17 (02-08-20 @ 06:11) (16 - 20)  SpO2: 98% (02-08-20 @ 06:11) (97% - 100%)  Wt(kg): --  I&O's Summary    Daily Height in cm: 160.02 (07 Feb 2020 23:33)    Daily     Appearance: Normal	  Psychiatry: A & O x 3, Mood & affect appropriate  HEENT:   Normal oral mucosa, PERRL, EOMI	  Lymphatic: No lymphadenopathy  Cardiovascular: Normal S1 S2,RRR, No JVD, No murmurs  Respiratory: Lungs clear to auscultation	  Gastrointestinal:  Soft, Non-tender, + BS	  Skin: No rashes, No ecchymoses, No cyanosis	  Neurologic: Non-focal  Extremities: Normal range of motion, No clubbing, cyanosis or edema  Vascular: Peripheral pulses palpable 2+ bilaterally    TELEMETRY: 	    ECG:  	sr, no acute ischemic abnl   RADIOLOGY:  OTHER: 	  	  LABS:	 	    CARDIAC MARKERS:  Troponin T, High Sensitivity: 9 ng/L (02-08-20 @ 06:30)  Troponin T, High Sensitivity: 8 ng/L (02-07-20 @ 21:15)  Troponin T, High Sensitivity: 7 ng/L (02-07-20 @ 19:20)        proBNP:     Lipid Profile:   HgA1c: Hemoglobin A1C, Whole Blood: 6.6 % (02-08-20 @ 06:30)    TSH: Thyroid Stimulating Hormone, Serum: 4.55 uIU/mL (02-08-20 @ 06:30)                            13.3   6.70  )-----------( 355      ( 07 Feb 2020 19:20 )             41.2     02-08    136  |  104  |  11  ----------------------------<  101<H>  3.8   |  21<L>  |  0.56    Ca    8.9      08 Feb 2020 06:30  Phos  2.9     02-08  Mg     2.3     02-08    TPro  8.1  /  Alb  3.9  /  TBili  0.5  /  DBili  x   /  AST  42<H>  /  ALT  49<H>  /  AlkPhos  74  02-08    PT/INR - ( 08 Feb 2020 06:30 )   PT: 11.0 SEC;   INR: 0.97          PTT - ( 08 Feb 2020 06:30 )  PTT:29.8 SEC    Creatinine, Serum: 0.56 mg/dL (02-08-20 @ 06:30)  Creatinine, Serum: 0.60 mg/dL (02-07-20 @ 19:20)    Advanced care planning discussed with patient. Advanced care planning forms discussed with patient and/or family.  Risks, benefits, and alternatives of medical/cardiac procedures were discussed in detail with all questions answered.  30 minutes were spent addressing advance care planning.      ASSESSMENT/PLAN:
[facility] NEPHROLOGY- CONSULTATION NOTE    64y Female with history of below presents with CP. Nephrology consulted for hyperproteinemia.    Patient admitted for chest pain and denies any h/o anemia, paraproteinemia or weight loss. Patient is a vegetarian.     REVIEW OF SYSTEMS:  Gen: no changes in weight  HEENT: no rhinorrhea  Neck: no sore throat  Cards: + chest pain resolved  Resp: no dyspnea  GI: no nausea or vomiting or diarrhea  : no dysuria or hematuria, + urinary frequency  Vascular: no LE edema  Derm: no rashes  Neuro: no numbness/tingling    No Known Allergies      Home Medications Reviewed  Hospital Medications:   MEDICATIONS  (STANDING):  aspirin enteric coated 81 milliGRAM(s) Oral daily  cefTRIAXone   IVPB      levothyroxine 50 MICROGram(s) Oral daily  pantoprazole    Tablet 40 milliGRAM(s) Oral before breakfast      PAST MEDICAL & SURGICAL HISTORY:  Hypothyroidism, unspecified type  No significant past surgical history      FAMILY HISTORY:  Family history of coronary artery disease: ~ brother (?s/p stent)  Family history of hypertension: ~ father      SOCIAL HISTORY:  Denies toxic substance use     VITALS:  T(F): 97.6 (20 @ 13:44), Max: 98.2 (20 @ 17:03)  HR: 83 (20 @ 13:44)  BP: 134/84 (20 @ 13:44)  RR: 17 (20 @ 13:44)  SpO2: 99% (20 @ 13:44)  Wt(kg): --    Height (cm): 160 ( @ 23:33)  Weight (kg): 69.6 ( @ 23:33)  BMI (kg/m2): 27.2 ( @ 23:33)  BSA (m2): 1.73 ( @ 23:33)    PHYSICAL EXAM:  Gen: NAD, calm  HEENT: MMM  Neck: no JVD  Cards: RRR, +S1/S2, no M/G/R  Resp: CTA B/L  GI: soft, NT/ND, NABS  : no CVA tenderness  Vascular: no LE edema B/L  Derm: no rashes  Neuro: non-focal    LABS:      136  |  104  |  11  ----------------------------<  101<H>  3.8   |  21<L>  |  0.56    Ca    8.9      2020 06:30  Phos  2.9     02-08  Mg     2.3     02-08    TPro  8.1  /  Alb  3.9  /  TBili  0.5  /  DBili      /  AST  42<H>  /  ALT  49<H>  /  AlkPhos  74  02-08    Creatinine Trend: 0.56 <--, 0.60 <--                        13.3   6.70  )-----------( 355      ( 2020 19:20 )             41.2     Urine Studies:  Urinalysis Basic - ( 2020 07:00 )    Color: COLORLESS / Appearance: CLEAR / S.005 / pH: 6.5  Gluc: NEGATIVE / Ketone: NEGATIVE  / Bili: NEGATIVE / Urobili: NORMAL   Blood: NEGATIVE / Protein: NEGATIVE / Nitrite: NEGATIVE   Leuk Esterase: LARGE / RBC: 0-2 / WBC 26-50   Sq Epi: OCC / Non Sq Epi:  / Bacteria: FEW          RADIOLOGY & ADDITIONAL STUDIES:  < from: Xray Chest 2 Views PA/Lat (20 @ 19:48) >  IMPRESSION:     Clear lungs      < end of copied text >

## 2020-02-08 NOTE — DISCHARGE NOTE PROVIDER - PROVIDER TOKENS
PROVIDER:[TOKEN:[[id number]],FOLLOWUP:[2 weeks]],FREE:[LAST:[Alfaro],FIRST:[],PHONE:[(   )    -],FAX:[(   )    -],ADDRESS:[Your cardiologist],FOLLOWUP:[2 weeks]],PROVIDER:[TOKEN:[[id number]],FOLLOWUP:[1 month]]

## 2020-02-08 NOTE — DISCHARGE NOTE PROVIDER - NSDCCPCAREPLAN_GEN_ALL_CORE_FT
PRINCIPAL DISCHARGE DIAGNOSIS  Diagnosis: Chest pain  Assessment and Plan of Treatment: You were admitted with chest pain.  your EKG had no changes, a stress test was performed and did not show any area of ischemia.  You had an echocardiogram which showed your EF as 73% Continue to take your aspirin as ordered and follow up with your cardiologist Dr. Alfaro.      SECONDARY DISCHARGE DIAGNOSES  Diagnosis: Hyperproteinemia  Assessment and Plan of Treatment: Your labs had high levels of protein in them,  Specialty labs were sent and have not resulted.  Follow up with Dr. Hancock from nephrology for follow up    Diagnosis: Hypothyroidism, unspecified type  Assessment and Plan of Treatment: Continue to take your synthroid as ordered.  Follow up with your Primary care provider for continued monitoring and maintenance    Diagnosis: UTI (urinary tract infection)  Assessment and Plan of Treatment: You were started on antibiotics for a urinary infection. To help prevent future infections maintain healthy hygiene and avoid taking long baths. Wipe from front to back and empty your bladder frequently by keeping yourself properly hydrated. Monitor for signs/symptoms of infection, such as, fever/chills, burning/pain with urination, urinary frequency/hesitancy, cloudy urine, or blood in urine and follow-up with your primary care provider as outpatient for further care.

## 2020-02-09 LAB
ANION GAP SERPL CALC-SCNC: 9 MMO/L — SIGNIFICANT CHANGE UP (ref 7–14)
BUN SERPL-MCNC: 11 MG/DL — SIGNIFICANT CHANGE UP (ref 7–23)
CALCIUM SERPL-MCNC: 8.7 MG/DL — SIGNIFICANT CHANGE UP (ref 8.4–10.5)
CHLORIDE SERPL-SCNC: 106 MMOL/L — SIGNIFICANT CHANGE UP (ref 98–107)
CO2 SERPL-SCNC: 24 MMOL/L — SIGNIFICANT CHANGE UP (ref 22–31)
CREAT SERPL-MCNC: 0.6 MG/DL — SIGNIFICANT CHANGE UP (ref 0.5–1.3)
GLUCOSE SERPL-MCNC: 103 MG/DL — HIGH (ref 70–99)
HCT VFR BLD CALC: 40.5 % — SIGNIFICANT CHANGE UP (ref 34.5–45)
HCV AB S/CO SERPL IA: 0.29 S/CO — SIGNIFICANT CHANGE UP (ref 0–0.99)
HCV AB SERPL-IMP: SIGNIFICANT CHANGE UP
HGB BLD-MCNC: 13.1 G/DL — SIGNIFICANT CHANGE UP (ref 11.5–15.5)
MAGNESIUM SERPL-MCNC: 2.2 MG/DL — SIGNIFICANT CHANGE UP (ref 1.6–2.6)
MCHC RBC-ENTMCNC: 28.9 PG — SIGNIFICANT CHANGE UP (ref 27–34)
MCHC RBC-ENTMCNC: 32.3 % — SIGNIFICANT CHANGE UP (ref 32–36)
MCV RBC AUTO: 89.2 FL — SIGNIFICANT CHANGE UP (ref 80–100)
NRBC # FLD: 0 K/UL — SIGNIFICANT CHANGE UP (ref 0–0)
PHOSPHATE SERPL-MCNC: 2.9 MG/DL — SIGNIFICANT CHANGE UP (ref 2.5–4.5)
PLATELET # BLD AUTO: 234 K/UL — SIGNIFICANT CHANGE UP (ref 150–400)
PMV BLD: 10.7 FL — SIGNIFICANT CHANGE UP (ref 7–13)
POTASSIUM SERPL-MCNC: 3.6 MMOL/L — SIGNIFICANT CHANGE UP (ref 3.5–5.3)
POTASSIUM SERPL-SCNC: 3.6 MMOL/L — SIGNIFICANT CHANGE UP (ref 3.5–5.3)
PROT SERPL-MCNC: 7.6 G/DL — SIGNIFICANT CHANGE UP (ref 6–8.3)
RBC # BLD: 4.54 M/UL — SIGNIFICANT CHANGE UP (ref 3.8–5.2)
RBC # FLD: 13 % — SIGNIFICANT CHANGE UP (ref 10.3–14.5)
SODIUM SERPL-SCNC: 139 MMOL/L — SIGNIFICANT CHANGE UP (ref 135–145)
WBC # BLD: 5.56 K/UL — SIGNIFICANT CHANGE UP (ref 3.8–10.5)
WBC # FLD AUTO: 5.56 K/UL — SIGNIFICANT CHANGE UP (ref 3.8–10.5)

## 2020-02-09 PROCEDURE — 84165 PROTEIN E-PHORESIS SERUM: CPT | Mod: 26

## 2020-02-09 PROCEDURE — 93970 EXTREMITY STUDY: CPT | Mod: 26

## 2020-02-09 PROCEDURE — 86334 IMMUNOFIX E-PHORESIS SERUM: CPT | Mod: 26

## 2020-02-09 PROCEDURE — 93018 CV STRESS TEST I&R ONLY: CPT | Mod: GC

## 2020-02-09 PROCEDURE — 93016 CV STRESS TEST SUPVJ ONLY: CPT | Mod: GC

## 2020-02-09 PROCEDURE — 93306 TTE W/DOPPLER COMPLETE: CPT | Mod: 26

## 2020-02-09 PROCEDURE — 78452 HT MUSCLE IMAGE SPECT MULT: CPT | Mod: 26

## 2020-02-09 RX ADMIN — Medication 50 MICROGRAM(S): at 05:23

## 2020-02-09 RX ADMIN — PANTOPRAZOLE SODIUM 40 MILLIGRAM(S): 20 TABLET, DELAYED RELEASE ORAL at 05:23

## 2020-02-09 RX ADMIN — Medication 30 MILLILITER(S): at 15:35

## 2020-02-09 RX ADMIN — Medication 81 MILLIGRAM(S): at 12:31

## 2020-02-09 RX ADMIN — CEFTRIAXONE 100 MILLIGRAM(S): 500 INJECTION, POWDER, FOR SOLUTION INTRAMUSCULAR; INTRAVENOUS at 14:16

## 2020-02-09 NOTE — PROGRESS NOTE ADULT - SUBJECTIVE AND OBJECTIVE BOX
Patient is a 64y old  Female who presents with a chief complaint of Chest pain (2020 13:20)    pt. seen and examined , NAD , no CP    INTERVAL HPI/OVERNIGHT EVENTS:  T(C): 36.7 (20 @ 13:38), Max: 36.7 (20 @ 13:38)  HR: 64 (20 @ 13:38) (64 - 69)  BP: 131/74 (20 @ 13:38) (115/73 - 131/74)  RR: 18 (20 @ 13:38) (17 - 18)  SpO2: 100% (20 @ 13:38) (97% - 100%)  Wt(kg): --  I&O's Summary      PAST MEDICAL & SURGICAL HISTORY:  Hypothyroidism, unspecified type  No significant past surgical history      SOCIAL HISTORY  Alcohol:  Tobacco:  Illicit substance use:    FAMILY HISTORY:    REVIEW OF SYSTEMS:  CONSTITUTIONAL: No fever, weight loss, or fatigue  EYES: No eye pain, visual disturbances, or discharge  ENMT:  No difficulty hearing, tinnitus, vertigo; No sinus or throat pain  NECK: No pain or stiffness  RESPIRATORY: No cough, wheezing, chills or hemoptysis; No shortness of breath  CARDIOVASCULAR: No chest pain, palpitations, dizziness, or leg swelling  GASTROINTESTINAL: No abdominal or epigastric pain. No nausea, vomiting, or hematemesis; No diarrhea or constipation. No melena or hematochezia.  GENITOURINARY: No dysuria, frequency, hematuria, or incontinence  NEUROLOGICAL: No headaches, memory loss, loss of strength, numbness, or tremors  SKIN: No itching, burning, rashes, or lesions   LYMPH NODES: No enlarged glands  ENDOCRINE: No heat or cold intolerance; No hair loss  MUSCULOSKELETAL: No joint pain or swelling; No muscle, back, or extremity pain  PSYCHIATRIC: No depression, anxiety, mood swings, or difficulty sleeping  HEME/LYMPH: No easy bruising, or bleeding gums  ALLERY AND IMMUNOLOGIC: No hives or eczema    RADIOLOGY & ADDITIONAL TESTS:    Imaging Personally Reviewed:  [ ] YES  [ ] NO    Consultant(s) Notes Reviewed:  [ ] YES  [ ] NO    PHYSICAL EXAM:  GENERAL: NAD, well-groomed, well-developed  HEAD:  Atraumatic, Normocephalic  EYES: EOMI, PERRLA, conjunctiva and sclera clear  ENMT: No tonsillar erythema, exudates, or enlargement; Moist mucous membranes, Good dentition, No lesions  NECK: Supple, No JVD, Normal thyroid  NERVOUS SYSTEM:  Alert & Oriented X3, Good concentration; Motor Strength 5/5 B/L upper and lower extremities; DTRs 2+ intact and symmetric  CHEST/LUNG: Clear to percussion bilaterally; No rales, rhonchi, wheezing, or rubs  HEART: Regular rate and rhythm; No murmurs, rubs, or gallops  ABDOMEN: Soft, Nontender, Nondistended; Bowel sounds present  EXTREMITIES:  2+ Peripheral Pulses, No clubbing, cyanosis, or edema  LYMPH: No lymphadenopathy noted  SKIN: No rashes or lesions    LABS:                        13.1   5.56  )-----------( 234      ( 2020 05:30 )             40.5     02-09    139  |  106  |  11  ----------------------------<  103<H>  3.6   |  24  |  0.60    Ca    8.7      2020 05:30  Phos  2.9     02-09  Mg     2.2     02-09    TPro  7.6  /  Alb  x   /  TBili  x   /  DBili  x   /  AST  x   /  ALT  x   /  AlkPhos  x   02-09    PT/INR - ( 2020 06:30 )   PT: 11.0 SEC;   INR: 0.97          PTT - ( 2020 06:30 )  PTT:29.8 SEC  Urinalysis Basic - ( 2020 07:00 )    Color: COLORLESS / Appearance: CLEAR / S.005 / pH: 6.5  Gluc: NEGATIVE / Ketone: NEGATIVE  / Bili: NEGATIVE / Urobili: NORMAL   Blood: NEGATIVE / Protein: NEGATIVE / Nitrite: NEGATIVE   Leuk Esterase: LARGE / RBC: 0-2 / WBC 26-50   Sq Epi: OCC / Non Sq Epi: x / Bacteria: FEW      CAPILLARY BLOOD GLUCOSE      POCT Blood Glucose.: 104 mg/dL (2020 16:30)  POCT Blood Glucose.: 136 mg/dL (2020 12:36)  POCT Blood Glucose.: 103 mg/dL (2020 07:36)  POCT Blood Glucose.: 178 mg/dL (2020 22:21)        Urinalysis Basic - ( 2020 07:00 )    Color: COLORLESS / Appearance: CLEAR / S.005 / pH: 6.5  Gluc: NEGATIVE / Ketone: NEGATIVE  / Bili: NEGATIVE / Urobili: NORMAL   Blood: NEGATIVE / Protein: NEGATIVE / Nitrite: NEGATIVE   Leuk Esterase: LARGE / RBC: 0-2 / WBC 26-50   Sq Epi: OCC / Non Sq Epi: x / Bacteria: FEW        MEDICATIONS  (STANDING):  aspirin enteric coated 81 milliGRAM(s) Oral daily  cefTRIAXone   IVPB 1000 milliGRAM(s) IV Intermittent every 24 hours  levothyroxine 50 MICROGram(s) Oral daily  pantoprazole    Tablet 40 milliGRAM(s) Oral before breakfast    MEDICATIONS  (PRN):  acetaminophen   Tablet .. 650 milliGRAM(s) Oral every 6 hours PRN Mild Pain (1 - 3)  aluminum hydroxide/magnesium hydroxide/simethicone Suspension 30 milliLiter(s) Oral every 6 hours PRN Dyspepsia      Care Discussed with Consultants/Other Providers [ ] YES  [ ] NO

## 2020-02-09 NOTE — PROGRESS NOTE ADULT - SUBJECTIVE AND OBJECTIVE BOX
CARDIOLOGY FOLLOW UP NOTE - DR. SUNSHINE    Subjective:    no chest pain, sob, palpitations    PHYSICAL EXAM:  T(C): 36.6 (02-09-20 @ 05:06), Max: 36.9 (02-08-20 @ 18:35)  HR: 69 (02-09-20 @ 05:06) (66 - 83)  BP: 115/73 (02-09-20 @ 05:06) (115/73 - 136/82)  RR: 17 (02-09-20 @ 05:06) (17 - 18)  SpO2: 97% (02-09-20 @ 05:06) (97% - 100%)  Wt(kg): --  I&O's Summary    Daily     Daily     Appearance: Normal	  Cardiovascular: Normal S1 S2,RRR, No JVD, No murmurs  Respiratory: Lungs clear to auscultation	  Gastrointestinal:  Soft, Non-tender, + BS	  Extremities: Normal range of motion, No clubbing, cyanosis or edema      Home Medications:  Aleve:  (08 Feb 2020 00:30)  Synthroid 50 mcg (0.05 mg) oral tablet: 1 tab(s) orally once a day (08 Feb 2020 00:30)      MEDICATIONS  (STANDING):  aspirin enteric coated 81 milliGRAM(s) Oral daily  cefTRIAXone   IVPB 1000 milliGRAM(s) IV Intermittent every 24 hours  levothyroxine 50 MICROGram(s) Oral daily  pantoprazole    Tablet 40 milliGRAM(s) Oral before breakfast      TELEMETRY: 	    ECG:  	  RADIOLOGY:   DIAGNOSTIC TESTING:  [ ] Echocardiogram:  [ ] Catheterization:  [ ] Stress Test:    OTHER: 	    LABS:	 	    CARDIAC MARKERS:                                13.1   5.56  )-----------( 234      ( 09 Feb 2020 05:30 )             40.5     02-09    139  |  106  |  11  ----------------------------<  103<H>  3.6   |  24  |  0.60    Ca    8.7      09 Feb 2020 05:30  Phos  2.9     02-09  Mg     2.2     02-09    TPro  7.6  /  Alb  x   /  TBili  x   /  DBili  x   /  AST  x   /  ALT  x   /  AlkPhos  x   02-09    proBNP:   PT/INR - ( 08 Feb 2020 06:30 )   PT: 11.0 SEC;   INR: 0.97          PTT - ( 08 Feb 2020 06:30 )  PTT:29.8 SEC  Lipid Profile:   HgA1c:     Creatinine, Serum: 0.60 mg/dL (02-09-20 @ 05:30)  Creatinine, Serum: 0.56 mg/dL (02-08-20 @ 06:30)  Creatinine, Serum: 0.60 mg/dL (02-07-20 @ 19:20)

## 2020-02-09 NOTE — PROGRESS NOTE ADULT - ASSESSMENT
64 year old female with history of Hypothyroidism admitted with intermittent L-sided chest pain.    1. Chest Pain  -multiple episodes  -asa  -check echo/stress today   -ppi  -trop negative    2. Hypertension  -bp normal off meds  -cont to monitor     3. Acute pain of both shoulders  -med eval     4. Hyperproteinemia.   -unclear etiology at present  -renal w/u     5. Hypothyroidism  -cont synthroid     6. Elev LFTS  GI w/u    7. abx per med for uti      dvt ppx

## 2020-02-09 NOTE — PROGRESS NOTE ADULT - SUBJECTIVE AND OBJECTIVE BOX
[facility] NEPHROLOGY- PROGRESS NOTE    64y Female with no significant PMHx presents with CP. Nephrology consulted for hyperproteinemia.    REVIEW OF SYSTEMS:  Gen: no changes in weight  Cards: + chest pain resolved  Resp: no dyspnea  GI: no nausea or vomiting or diarrhea  Vascular: no LE edema    No Known Allergies      Hospital Medications: Medications reviewed    VITALS:  T(F): 97.9 (20 @ 05:06), Max: 98.4 (20 @ 18:35)  HR: 69 (20 @ 05:06)  BP: 115/73 (20 @ 05:06)  RR: 17 (20 @ 05:06)  SpO2: 97% (20 @ 05:06)  Wt(kg): --  Height (cm): 160 ( @ 23:33)  Weight (kg): 69.6 ( @ 23:33)  BMI (kg/m2): 27.2 ( 23:33)  BSA (m2): 1.73 ( 23:33)      PHYSICAL EXAM:    Gen: NAD, calm  Cards: RRR, +S1/S2, no M/G/R  Resp: CTA B/L  GI: soft, NT/ND, NABS  Vascular: no LE edema B/L    LABS:      139  |  106  |  11  ----------------------------<  103<H>  3.6   |  24  |  0.60    Ca    8.7      2020 05:30  Phos  2.9       Mg     2.2         TPro  7.6  /  Alb      /  TBili      /  DBili      /  AST      /  ALT      /  AlkPhos          Creatinine Trend: 0.60 <--, 0.56 <--, 0.60 <--                        13.1   5.56  )-----------( 234      ( 2020 05:30 )             40.5     Urine Studies:  Urinalysis Basic - ( 2020 07:00 )    Color: COLORLESS / Appearance: CLEAR / S.005 / pH: 6.5  Gluc: NEGATIVE / Ketone: NEGATIVE  / Bili: NEGATIVE / Urobili: NORMAL   Blood: NEGATIVE / Protein: NEGATIVE / Nitrite: NEGATIVE   Leuk Esterase: LARGE / RBC: 0-2 / WBC 26-50   Sq Epi: OCC / Non Sq Epi:  / Bacteria: FEW

## 2020-02-09 NOTE — PROGRESS NOTE ADULT - ASSESSMENT
64y Female with no significant PMHx presents with CP. Nephrology consulted for hyperproteinemia.    1) Hyperproteinemia: likely due to diet given normal albumin but follow up paraproteinemia work up r/o underlying bone marrow disorder.    2) HTN: BP controlled off of medications.     3) Acute cystitis without hematuria: On empiric CTX X 3 days. Urine culture pending.    4) CP: As per cardiology. NST negative.

## 2020-02-09 NOTE — PROGRESS NOTE ADULT - SUBJECTIVE AND OBJECTIVE BOX
CHRISTINA GONZALEZ:[id number],   64yFemale followed for:  No Known Allergies    PAST MEDICAL & SURGICAL HISTORY:  Hypothyroidism, unspecified type  No significant past surgical history    FAMILY HISTORY:  Family history of coronary artery disease: ~ brother (?s/p stent)  Family history of hypertension: ~ father    MEDICATIONS  (STANDING):  aspirin enteric coated 81 milliGRAM(s) Oral daily  cefTRIAXone   IVPB 1000 milliGRAM(s) IV Intermittent every 24 hours  levothyroxine 50 MICROGram(s) Oral daily  pantoprazole    Tablet 40 milliGRAM(s) Oral before breakfast    MEDICATIONS  (PRN):  acetaminophen   Tablet .. 650 milliGRAM(s) Oral every 6 hours PRN Mild Pain (1 - 3)      Vital Signs Last 24 Hrs  T(C): 36.6 (09 Feb 2020 05:06), Max: 36.9 (08 Feb 2020 18:35)  T(F): 97.9 (09 Feb 2020 05:06), Max: 98.4 (08 Feb 2020 18:35)  HR: 69 (09 Feb 2020 05:06) (66 - 83)  BP: 115/73 (09 Feb 2020 05:06) (115/73 - 136/82)  BP(mean): --  RR: 17 (09 Feb 2020 05:06) (17 - 18)  SpO2: 97% (09 Feb 2020 05:06) (97% - 100%)  nc/at  s1s2  cta  soft, nt, nd no guarding or rebound  no c/c/e    CBC Full  -  ( 09 Feb 2020 05:30 )  WBC Count : 5.56 K/uL  RBC Count : 4.54 M/uL  Hemoglobin : 13.1 g/dL  Hematocrit : 40.5 %  Platelet Count - Automated : 234 K/uL  Mean Cell Volume : 89.2 fL  Mean Cell Hemoglobin : 28.9 pg  Mean Cell Hemoglobin Concentration : 32.3 %  Auto Neutrophil # : x  Auto Lymphocyte # : x  Auto Monocyte # : x  Auto Eosinophil # : x  Auto Basophil # : x  Auto Neutrophil % : x  Auto Lymphocyte % : x  Auto Monocyte % : x  Auto Eosinophil % : x  Auto Basophil % : x    02-09    139  |  106  |  11  ----------------------------<  103<H>  3.6   |  24  |  0.60    Ca    8.7      09 Feb 2020 05:30  Phos  2.9     02-09  Mg     2.2     02-09    TPro  7.6  /  Alb  x   /  TBili  x   /  DBili  x   /  AST  x   /  ALT  x   /  AlkPhos  x   02-09    PT/INR - ( 08 Feb 2020 06:30 )   PT: 11.0 SEC;   INR: 0.97          PTT - ( 08 Feb 2020 06:30 )  PTT:29.8 SEC

## 2020-02-10 VITALS
SYSTOLIC BLOOD PRESSURE: 121 MMHG | TEMPERATURE: 98 F | HEART RATE: 71 BPM | DIASTOLIC BLOOD PRESSURE: 71 MMHG | OXYGEN SATURATION: 99 % | RESPIRATION RATE: 18 BRPM

## 2020-02-10 LAB
ANION GAP SERPL CALC-SCNC: 12 MMO/L — SIGNIFICANT CHANGE UP (ref 7–14)
BACTERIA UR CULT: SIGNIFICANT CHANGE UP
BUN SERPL-MCNC: 9 MG/DL — SIGNIFICANT CHANGE UP (ref 7–23)
CALCIUM SERPL-MCNC: 9.2 MG/DL — SIGNIFICANT CHANGE UP (ref 8.4–10.5)
CHLORIDE SERPL-SCNC: 104 MMOL/L — SIGNIFICANT CHANGE UP (ref 98–107)
CO2 SERPL-SCNC: 23 MMOL/L — SIGNIFICANT CHANGE UP (ref 22–31)
CREAT SERPL-MCNC: 0.59 MG/DL — SIGNIFICANT CHANGE UP (ref 0.5–1.3)
GLUCOSE SERPL-MCNC: 104 MG/DL — HIGH (ref 70–99)
HAV IGM SER-ACNC: NONREACTIVE — SIGNIFICANT CHANGE UP
HBV CORE IGM SER-ACNC: NONREACTIVE — SIGNIFICANT CHANGE UP
HBV SURFACE AG SER-ACNC: NONREACTIVE — SIGNIFICANT CHANGE UP
HCT VFR BLD CALC: 40.9 % — SIGNIFICANT CHANGE UP (ref 34.5–45)
HCV AB S/CO SERPL IA: 0.27 S/CO — SIGNIFICANT CHANGE UP (ref 0–0.99)
HCV AB SERPL-IMP: SIGNIFICANT CHANGE UP
HGB BLD-MCNC: 13.5 G/DL — SIGNIFICANT CHANGE UP (ref 11.5–15.5)
KAPPA FREE LIGHT CHAINS, SERUM: 1.94 MG/DL — SIGNIFICANT CHANGE UP (ref 0.33–1.94)
LAMBDA FREE LIGHT CHAINS, SERUM: 1.67 MG/DL — SIGNIFICANT CHANGE UP (ref 0.57–2.63)
MAGNESIUM SERPL-MCNC: 2.3 MG/DL — SIGNIFICANT CHANGE UP (ref 1.6–2.6)
MCHC RBC-ENTMCNC: 29.4 PG — SIGNIFICANT CHANGE UP (ref 27–34)
MCHC RBC-ENTMCNC: 33 % — SIGNIFICANT CHANGE UP (ref 32–36)
MCV RBC AUTO: 89.1 FL — SIGNIFICANT CHANGE UP (ref 80–100)
MITOCHONDRIA AB SER-ACNC: SIGNIFICANT CHANGE UP
NRBC # FLD: 0 K/UL — SIGNIFICANT CHANGE UP (ref 0–0)
PHOSPHATE SERPL-MCNC: 2.9 MG/DL — SIGNIFICANT CHANGE UP (ref 2.5–4.5)
PLATELET # BLD AUTO: 339 K/UL — SIGNIFICANT CHANGE UP (ref 150–400)
PMV BLD: 10.3 FL — SIGNIFICANT CHANGE UP (ref 7–13)
POTASSIUM SERPL-MCNC: 4.2 MMOL/L — SIGNIFICANT CHANGE UP (ref 3.5–5.3)
POTASSIUM SERPL-SCNC: 4.2 MMOL/L — SIGNIFICANT CHANGE UP (ref 3.5–5.3)
RBC # BLD: 4.59 M/UL — SIGNIFICANT CHANGE UP (ref 3.8–5.2)
RBC # FLD: 13.1 % — SIGNIFICANT CHANGE UP (ref 10.3–14.5)
SMOOTH MUSCLE AB SER-ACNC: SIGNIFICANT CHANGE UP
SODIUM SERPL-SCNC: 139 MMOL/L — SIGNIFICANT CHANGE UP (ref 135–145)
SPECIMEN SOURCE: SIGNIFICANT CHANGE UP
WBC # BLD: 7.22 K/UL — SIGNIFICANT CHANGE UP (ref 3.8–10.5)
WBC # FLD AUTO: 7.22 K/UL — SIGNIFICANT CHANGE UP (ref 3.8–10.5)

## 2020-02-10 PROCEDURE — 99238 HOSP IP/OBS DSCHRG MGMT 30/<: CPT

## 2020-02-10 PROCEDURE — 76705 ECHO EXAM OF ABDOMEN: CPT | Mod: 26

## 2020-02-10 RX ORDER — PANTOPRAZOLE SODIUM 20 MG/1
1 TABLET, DELAYED RELEASE ORAL
Qty: 30 | Refills: 0
Start: 2020-02-10 | End: 2020-03-10

## 2020-02-10 RX ORDER — ASPIRIN/CALCIUM CARB/MAGNESIUM 324 MG
1 TABLET ORAL
Qty: 30 | Refills: 0
Start: 2020-02-10 | End: 2020-03-10

## 2020-02-10 RX ADMIN — Medication 50 MICROGRAM(S): at 06:36

## 2020-02-10 RX ADMIN — Medication 81 MILLIGRAM(S): at 12:16

## 2020-02-10 RX ADMIN — PANTOPRAZOLE SODIUM 40 MILLIGRAM(S): 20 TABLET, DELAYED RELEASE ORAL at 06:36

## 2020-02-10 RX ADMIN — CEFTRIAXONE 100 MILLIGRAM(S): 500 INJECTION, POWDER, FOR SOLUTION INTRAMUSCULAR; INTRAVENOUS at 14:33

## 2020-02-10 NOTE — PROGRESS NOTE ADULT - ASSESSMENT
conitnue conservative gi mangmetn, ppi therapy and sonogram. pt with negative hepatitis viral serologies

## 2020-02-10 NOTE — PROGRESS NOTE ADULT - ATTENDING COMMENTS
[facility] NEPHROLOGY  Jose Nolan M.D.  Beto Hancock D.O.  Sydnie Martinez M.D.  Tami Kearns, MSN, ANP-C    Telephone: [phone]  Facsimile: [phone]    [address] [place] [address]
[facility] NEPHROLOGY  Jose Nolan M.D.  Beto Hancock D.O.  Sydnie Martinez M.D.  Tami Kearns, MSN, ANP-C    Telephone: [phone]  Facsimile: [phone]    [address] [place] [address]
Patient seen and examined, agree with the above assessment and plan by MCKAYLA Escalera.  cv stable  echo/stress normal  abdom us reveals fatty liver-outpt GI followup  DC home

## 2020-02-10 NOTE — PROGRESS NOTE ADULT - SUBJECTIVE AND OBJECTIVE BOX
CARDIOLOGY FOLLOW UP - Dr. Mittal    CC no  cp or sob       PHYSICAL EXAM:  T(C): 36.6 (02-10-20 @ 09:11), Max: 36.6 (02-10-20 @ 09:11)  HR: 70 (02-10-20 @ 09:11) (59 - 70)  BP: 128/71 (02-10-20 @ 09:11) (119/75 - 128/71)  RR: 18 (02-10-20 @ 09:11) (16 - 18)  SpO2: 100% (02-10-20 @ 09:11) (100% - 100%)  Wt(kg): --  I&O's Summary      Appearance: Normal	  Cardiovascular: Normal S1 S2,RRR, No JVD, No murmurs  Respiratory: Lungs clear to auscultation	  Gastrointestinal:  Soft, Non-tender, + BS	  Extremities: Normal range of motion, No clubbing, cyanosis or edema        MEDICATIONS  (STANDING):  aspirin enteric coated 81 milliGRAM(s) Oral daily  cefTRIAXone   IVPB 1000 milliGRAM(s) IV Intermittent every 24 hours  levothyroxine 50 MICROGram(s) Oral daily  pantoprazole    Tablet 40 milliGRAM(s) Oral before breakfast      TELEMETRY: nsr	    ECG:  	  RADIOLOGY:   DIAGNOSTIC TESTING:  [ ] Echocardiogram:  < from: Transthoracic Echocardiogram (02.09.20 @ 08:51) >  Ejection Fraction (Teicholtz): 72 %  Peak Velocity (m/sec): AoV=1.4  ------------------------------------------------------------------------  OBSERVATIONS:  Mitral Valve: Normal mitral valve. Mild mitral  regurgitation.  Aortic Root: Normal aortic root.  Aortic Valve: Normal trileaflet aortic valve. Peak  transaortic valve gradient equals 7 mm Hg. Minimal aortic  regurgitation.  Left Atrium: Normal left atrium.  LA volume index = 23  cc/m2.  Left Ventricle: Normal left ventricular systolic function.  No segmental wall motion abnormalities. Normal left  ventricular internal dimensionsand wall thicknesses.  Right Heart: Normal right atrium. Normal right ventricular  size and function. Normal tricuspid valve.  Minimal  tricuspid regurgitation. Normal pulmonic valve. Minimal  pulmonic regurgitation.  Pericardium/PleuraNormal pericardium with no pericardial  effusion.  Hemodynamic: Estimated right ventricular systolic pressure  equals 31 mm Hg, assuming right atrial pressure equals 10  mm Hg, consistent with normal pulmonary pressures.  ------------------------------------------------------------------------  CONCLUSIONS:  1. Normal left ventricular internal dimensions and wall  thicknesses.  2. Normal left ventricular systolic function. No segmental  wall motion abnormalities.  3. Normal right ventricular size and function.  *** No previous Echo exam.  ------------------------------------------------------------------------  Confirmed on  2/9/2020 - 17:42:05 by Rory Riddle M.D.  ------------------------------------------------------------------------    < end of copied text >    [ ]  Catheterization:    [ ] Stress Test:    < from: Nuclear Stress Test-Exercise (02.09.20 @ 12:56) >  GATED ANALYSIS:  Post-stress gated wall motion analysis was performed (LVEF  > 70%;LVEDV = 53 ml.)  ------------------------------------------------------------------------  IMPRESSIONS:Normal Study  * The left ventricle was normal in size.  * Tracer uptake was homogeneous throughout the left  ventricle.  * Normal study; no evidence for myocardial infarction or  ischemia.  * Gated wall motion analysis was performed, and shows  normal wall motion.  ------------------------------------------------------------------------  Confirmed on  2/9/2020 - 12:58:18 by Rosales Collado M.D.  ------------------------------------------------------------------------    < end of copied text >    OTHER: 	  < from: US Abdomen Limited (02.10.20 @ 11:02) >    FINDINGS:    Liver:Increased echogenicity of the liver with posterior attenuation of sound typical for fatty infiltration.    Bile ducts: Normal caliber. Common bile duct measures 4 mm.     Gallbladder: Within normal limits.        Pancreas: Visualized portions are within normal limits.    Right kidney: 9.4 cm. No hydronephrosis.    Ascites: None.    IVC: Visualized portions are within normal limits.    IMPRESSION:     Fatty infiltration of the liver.              < end of copied text >    LABS:	 	    Troponin T, High Sensitivity: < 6 ng/L [<6 - 14] (02-08 @ 14:15)  Troponin T, High Sensitivity: 9 ng/L [<6 - 14] (02-08 @ 06:30)  Troponin T, High Sensitivity: 8 ng/L [<6 - 14] (02-07 @ 21:15)  Troponin T, High Sensitivity: 7 ng/L [<6 - 14] (02-07 @ 19:20)                          13.5   7.22  )-----------( 339      ( 10 Feb 2020 05:15 )             40.9     02-10    139  |  104  |  9   ----------------------------<  104<H>  4.2   |  23  |  0.59    Ca    9.2      10 Feb 2020 05:15  Phos  2.9     02-10  Mg     2.3     02-10    TPro  7.6  /  Alb  x   /  TBili  x   /  DBili  x   /  AST  x   /  ALT  x   /  AlkPhos  x   02-09

## 2020-02-10 NOTE — PROVIDER CONTACT NOTE (OTHER) - SITUATION
pt's blood sugar was low at 66 @11:47. RN followed hypoglycemic protocol and gave apple juice. pt's sugar  is mow 181

## 2020-02-10 NOTE — DISCHARGE NOTE NURSING/CASE MANAGEMENT/SOCIAL WORK - PATIENT PORTAL LINK FT
You can access the FollowMyHealth Patient Portal offered by [facility] by registering at the following website: [email or web address]. By joining [facility]’s FollowMyHealth portal, you will also be able to view your health information using other applications (apps) compatible with our system.

## 2020-02-10 NOTE — PROGRESS NOTE ADULT - ASSESSMENT
64 year old female with history of Hypothyroidism admitted with intermittent L-sided chest pain.    1. Chest Pain  -multiple episodes, resolved    -asa  -echo revealed EF 72%, nl LV fx  - stress test with no evidence of ischemia or infarct   -ppi  -trop negative    2. Hypertension  -bp normal off meds  -cont to monitor     3. Acute pain of both shoulders  -med eval     4. Hyperproteinemia.   -unclear etiology at present  -renal w/u / follow up as outpt     5. Hypothyroidism  -cont synthroid     6. Elev LFTS  GI  f/u noted, US  with  fatty infiltration of liver     7. abx per med for uti        DCP planning today    dvt ppx

## 2020-02-10 NOTE — PROGRESS NOTE ADULT - SUBJECTIVE AND OBJECTIVE BOX
CHRISTINA GONZALEZ:[id number],   64yFemale followed for:  No Known Allergies    PAST MEDICAL & SURGICAL HISTORY:  Hypothyroidism, unspecified type  No significant past surgical history    FAMILY HISTORY:  Family history of coronary artery disease: ~ brother (?s/p stent)  Family history of hypertension: ~ father    MEDICATIONS  (STANDING):  aspirin enteric coated 81 milliGRAM(s) Oral daily  cefTRIAXone   IVPB 1000 milliGRAM(s) IV Intermittent every 24 hours  levothyroxine 50 MICROGram(s) Oral daily  pantoprazole    Tablet 40 milliGRAM(s) Oral before breakfast    MEDICATIONS  (PRN):  acetaminophen   Tablet .. 650 milliGRAM(s) Oral every 6 hours PRN Mild Pain (1 - 3)  aluminum hydroxide/magnesium hydroxide/simethicone Suspension 30 milliLiter(s) Oral every 6 hours PRN Dyspepsia      Vital Signs Last 24 Hrs  T(C): 36.5 (10 Feb 2020 05:25), Max: 36.7 (09 Feb 2020 13:38)  T(F): 97.7 (10 Feb 2020 05:25), Max: 98 (09 Feb 2020 13:38)  HR: 62 (10 Feb 2020 05:25) (59 - 64)  BP: 119/75 (10 Feb 2020 05:25) (119/75 - 131/74)  BP(mean): --  RR: 17 (10 Feb 2020 05:25) (16 - 18)  SpO2: 100% (10 Feb 2020 05:25) (100% - 100%)  nc/at  s1s2  cta  soft, nt, nd no guarding or rebound  no c/c/e    CBC Full  -  ( 10 Feb 2020 05:15 )  WBC Count : 7.22 K/uL  RBC Count : 4.59 M/uL  Hemoglobin : 13.5 g/dL  Hematocrit : 40.9 %  Platelet Count - Automated : 339 K/uL  Mean Cell Volume : 89.1 fL  Mean Cell Hemoglobin : 29.4 pg  Mean Cell Hemoglobin Concentration : 33.0 %  Auto Neutrophil # : x  Auto Lymphocyte # : x  Auto Monocyte # : x  Auto Eosinophil # : x  Auto Basophil # : x  Auto Neutrophil % : x  Auto Lymphocyte % : x  Auto Monocyte % : x  Auto Eosinophil % : x  Auto Basophil % : x    02-10    139  |  104  |  9   ----------------------------<  104<H>  4.2   |  23  |  0.59    Ca    9.2      10 Feb 2020 05:15  Phos  2.9     02-10  Mg     2.3     02-10    TPro  7.6  /  Alb  x   /  TBili  x   /  DBili  x   /  AST  x   /  ALT  x   /  AlkPhos  x   02-09

## 2020-02-10 NOTE — PROGRESS NOTE ADULT - SUBJECTIVE AND OBJECTIVE BOX
[facility] NEPHROLOGY- PROGRESS NOTE    64y Female with no significant PMHx presents with CP. Nephrology consulted for hyperproteinemia.    REVIEW OF SYSTEMS:  Gen: no changes in weight  Cards: + chest pain resolved  Resp: no dyspnea  GI: no nausea or vomiting or diarrhea  Vascular: no LE edema    No Known Allergies      Hospital Medications: Medications reviewed      VITALS:  T(F): 97.8 (02-10-20 @ 09:11), Max: 98 (20 @ 13:38)  HR: 70 (02-10-20 @ 09:11)  BP: 128/71 (02-10-20 @ 09:11)  RR: 18 (02-10-20 @ 09:11)  SpO2: 100% (02-10-20 @ 09:11)  Wt(kg): --    PHYSICAL EXAM:    Gen: NAD, calm  Cards: RRR, +S1/S2, no M/G/R  Resp: CTA B/L  GI: soft, NT/ND, NABS  Vascular: no LE edema B/L        LABS:  02-10    139  |  104  |  9   ----------------------------<  104<H>  4.2   |  23  |  0.59    Ca    9.2      10 Feb 2020 05:15  Phos  2.9     02-10  Mg     2.3     02-10    TPro  7.6  /  Alb      /  TBili      /  DBili      /  AST      /  ALT      /  AlkPhos          Creatinine Trend: 0.59 <--, 0.60 <--, 0.56 <--, 0.60 <--                        13.5   7.22  )-----------( 339      ( 10 Feb 2020 05:15 )             40.9     Urine Studies:  Urinalysis Basic - ( 2020 07:00 )    Color: COLORLESS / Appearance: CLEAR / S.005 / pH: 6.5  Gluc: NEGATIVE / Ketone: NEGATIVE  / Bili: NEGATIVE / Urobili: NORMAL   Blood: NEGATIVE / Protein: NEGATIVE / Nitrite: NEGATIVE   Leuk Esterase: LARGE / RBC: 0-2 / WBC 26-50   Sq Epi: OCC / Non Sq Epi:  / Bacteria: FEW      < from: US Abdomen Limited (02.10.20 @ 11:02) >  IMPRESSION:     Fatty infiltration of the liver.    < end of copied text >

## 2020-02-10 NOTE — PROGRESS NOTE ADULT - ASSESSMENT
64y Female with no significant PMHx presents with CP. Nephrology consulted for hyperproteinemia.    1) Hyperproteinemia: likely due to diet given normal albumin but follow up paraproteinemia work up r/o underlying bone marrow disorder. If patient ready for discharge today, can follow up pending results as an outpatient.    2) HTN: BP controlled off of medications.     3) Acute cystitis without hematuria: On empiric CTX X 3 days (due to be completed today). Urine culture contaminated.    4) CP: As per cardiology. NST negative.

## 2020-02-12 LAB
ANA PAT FLD IF-IMP: SIGNIFICANT CHANGE UP
ANA TITR SER: SIGNIFICANT CHANGE UP
KAPPA/LAMBDA FREE LIGHT CHAIN RATIO, SERUM: 1.16 — SIGNIFICANT CHANGE UP

## 2020-02-18 LAB — GAS PNL BLDMV: SIGNIFICANT CHANGE UP

## 2020-02-20 LAB — GAS PNL BLDMV: SIGNIFICANT CHANGE UP

## 2020-10-23 NOTE — H&P ADULT - PROBLEM SELECTOR PROBLEM 2
Schedule 3 hours down time/alone time per week  Get outside/physical activity  Trazodone for sleep        Magnesium malate 500mg  B2 400mg per day   Acute pain of both shoulders Elevated blood pressure reading none

## 2020-12-11 NOTE — CONSULT NOTE ADULT - CONSULT REQUESTED BY NAME
Chief Complaint   Patient presents with     Covid 19 Testing     Pre-op     Pre-op test for procedure with  at [facility] on 12/15/20    Medication Reconciliation: complete    Ramandeep Cleary LPN     Dr. Mittal

## 2022-12-22 NOTE — PATIENT PROFILE ADULT - NSPROPASSIVESMOKEEXPOSURE_GEN_A_NUR
Labs improved.  Potassium now normal and calcium level improving.  The patient is to continue her current medications.  Recheck labs next visit.
Patient came for labs.   
No

## 2023-08-29 NOTE — ED PROVIDER NOTE - FAMILY HISTORY
No pertinent family history in first degree relatives no abdominal pain, no bloating, no constipation, no diarrhea, no nausea and no vomiting.

## 2024-05-02 NOTE — DISCHARGE NOTE NURSING/CASE MANAGEMENT/SOCIAL WORK - NSDCPEFALRISK_GEN_ALL_CORE
Addended by: WILD POLLACK on: 5/1/2024 09:53 PM     Modules accepted: Orders     Patient information on fall and injury prevention

## 2024-11-12 NOTE — ED PROVIDER NOTE - PROGRESS NOTE
Depression Screening Follow-up Plan: Patient's depression screening was positive with a PHQ-9 score of 18.           Stable.